# Patient Record
Sex: MALE | Race: BLACK OR AFRICAN AMERICAN | Employment: UNEMPLOYED | URBAN - METROPOLITAN AREA
[De-identification: names, ages, dates, MRNs, and addresses within clinical notes are randomized per-mention and may not be internally consistent; named-entity substitution may affect disease eponyms.]

---

## 2017-12-02 ENCOUNTER — APPOINTMENT (OUTPATIENT)
Dept: GENERAL RADIOLOGY | Age: 37
End: 2017-12-02
Attending: PHYSICIAN ASSISTANT
Payer: MEDICAID

## 2017-12-02 ENCOUNTER — HOSPITAL ENCOUNTER (EMERGENCY)
Age: 37
Discharge: HOME | End: 2017-12-02
Attending: EMERGENCY MEDICINE
Payer: MEDICAID

## 2017-12-02 VITALS
TEMPERATURE: 98.4 F | DIASTOLIC BLOOD PRESSURE: 87 MMHG | BODY MASS INDEX: 23.82 KG/M2 | WEIGHT: 143 LBS | OXYGEN SATURATION: 96 % | HEART RATE: 89 BPM | RESPIRATION RATE: 20 BRPM | HEIGHT: 65 IN | SYSTOLIC BLOOD PRESSURE: 137 MMHG

## 2017-12-02 DIAGNOSIS — Z20.2 POSSIBLE EXPOSURE TO STD: ICD-10-CM

## 2017-12-02 DIAGNOSIS — R05.9 COUGH: Primary | ICD-10-CM

## 2017-12-02 LAB
APPEARANCE UR: CLEAR
BACTERIA URNS QL MICRO: ABNORMAL /HPF
BILIRUB UR QL: NEGATIVE
COLOR UR: YELLOW
EPITH CASTS URNS QL MICRO: ABNORMAL /LPF (ref 0–5)
GLUCOSE UR STRIP.AUTO-MCNC: NEGATIVE MG/DL
HGB UR QL STRIP: ABNORMAL
KETONES UR QL STRIP.AUTO: ABNORMAL MG/DL
LEUKOCYTE ESTERASE UR QL STRIP.AUTO: ABNORMAL
MUCOUS THREADS URNS QL MICRO: ABNORMAL /LPF
NITRITE UR QL STRIP.AUTO: NEGATIVE
PH UR STRIP: 6.5 [PH] (ref 5–8)
PROT UR STRIP-MCNC: NEGATIVE MG/DL
RBC #/AREA URNS HPF: ABNORMAL /HPF (ref 0–5)
SP GR UR REFRACTOMETRY: 1.03 (ref 1–1.03)
UROBILINOGEN UR QL STRIP.AUTO: 1 EU/DL (ref 0.2–1)
WBC URNS QL MICRO: ABNORMAL /HPF (ref 0–4)

## 2017-12-02 PROCEDURE — 87491 CHLMYD TRACH DNA AMP PROBE: CPT

## 2017-12-02 PROCEDURE — 99283 EMERGENCY DEPT VISIT LOW MDM: CPT

## 2017-12-02 PROCEDURE — 81001 URINALYSIS AUTO W/SCOPE: CPT

## 2017-12-02 PROCEDURE — 71020 XR CHEST PA LAT: CPT

## 2017-12-02 NOTE — ED TRIAGE NOTES
Patient states that he started having a cough last Saturday and it has progressively gotten worse.   Patient also states that he had unprotected sex and wants to be checked for an STD

## 2017-12-02 NOTE — ED PROVIDER NOTES
EMERGENCY DEPARTMENT HISTORY AND PHYSICAL EXAM    12:00 PM      Date: 12/2/2017  Patient Name: Jazmyn Michelle    History of Presenting Illness     Chief Complaint   Patient presents with    Cough         History Provided By: Patient    Chief Complaint: cough  Duration:  Days  Timing:  Constant  Location: chest  Quality: Unproductive  Severity: Moderate  Modifying Factors: no relief with OTC cough medication  Associated Symptoms: denies fever and congestion      Additional History (Context): Jazmyn Michelle is a 40 y.o. male with bipolar disorder and paranoid schizophrenia who presents with unproductive cough onset seven days ago. Pt denies fever and congestion and stated he took OTC cough medication that did not provide any relief. Pt admits that he smokes cigars regularly and currently does not have a stable place to live; pt stated he has been staying in a motel for five days but he does not know where he will sleep tonight. After discussion with the provider the pt asked for resources for a homeless shelter in the area. Pt additionally noted that he had unprotective sex twice recently and he would like to be checked for STD's. Pt denies panile discharge, dysuria, hematuria, and swelling in his penis and testicles. Corazon Quick PCP: PROVIDER UNKNOWN        Past History     Past Medical History:  Past Medical History:   Diagnosis Date    Psychiatric disorder     bipolar        Past Surgical History:  History reviewed. No pertinent surgical history. Family History:  History reviewed. No pertinent family history. Social History:  Social History   Substance Use Topics    Smoking status: Current Every Day Smoker    Smokeless tobacco: None    Alcohol use No       Allergies:  No Known Allergies      Review of Systems     Review of Systems   Constitutional: Negative for chills, fatigue and fever. HENT: Negative. Negative for congestion and sore throat. Eyes: Negative. Respiratory: Positive for cough.  Negative for shortness of breath. Cardiovascular: Negative for chest pain and palpitations. Gastrointestinal: Negative for abdominal pain, nausea and vomiting. Genitourinary: Negative for discharge, dysuria, penile pain, penile swelling, scrotal swelling and testicular pain. Musculoskeletal: Negative. Skin: Negative. Neurological: Negative for dizziness, weakness, light-headedness and headaches. Psychiatric/Behavioral: Negative. All other systems reviewed and are negative. Physical Exam     Visit Vitals    /87 (BP 1 Location: Left arm, BP Patient Position: At rest;Sitting)    Pulse 89    Temp 98.4 °F (36.9 °C)    Resp 20    Ht 5' 5\" (1.651 m)    Wt 64.9 kg (143 lb)    SpO2 96%    BMI 23.8 kg/m2     Physical Exam   Constitutional: He is oriented to person, place, and time. He appears well-developed and well-nourished. No distress. HENT:   Head: Normocephalic and atraumatic. Mouth/Throat: Oropharynx is clear and moist.   Eyes: Conjunctivae are normal. No scleral icterus. Neck: Neck supple. No JVD present. No tracheal deviation present. Cardiovascular: Normal rate, regular rhythm and normal heart sounds. Pulmonary/Chest: Effort normal and breath sounds normal. No respiratory distress. He has no wheezes. He has no rales. He exhibits no tenderness. Abdominal: Soft. There is no tenderness. Genitourinary:   Genitourinary Comments: Deferred exam because pt has no sx or complaints   Musculoskeletal: Normal range of motion. Neurological: He is alert and oriented to person, place, and time. He has normal strength. Gait normal. GCS eye subscore is 4. GCS verbal subscore is 5. GCS motor subscore is 6. Skin: Skin is warm and dry. He is not diaphoretic. Psychiatric: He has a normal mood and affect. Nursing note and vitals reviewed. Diagnostic Study Results     Labs -  No results found for this or any previous visit (from the past 12 hour(s)).     Radiologic Studies -   XR CHEST PA LAT    (Results Pending)   negative for acute infiltrate/effusion      Medical Decision Making   I am the first provider for this patient. I reviewed the vital signs, available nursing notes, past medical history, past surgical history, family history and social history. Vital Signs-Reviewed the patient's vital signs. Pulse Oximetry Analysis -  96% on room air    Records Reviewed: Old Medical Records (Time of Review: 12:00 PM)    ED Course: Progress Notes, Reevaluation, and Consults:      Provider Notes (Medical Decision Making):   12:16 PM  41 y/o male c/o cough x 1 week. Also concerned about possible STD exposure due to recent unprotected sex. Denied any recent fevers, chills, productive cough, sore throat, c/p, SOB, abd pain, dysuria, penile discharge, pain or swelling in testicles and has no other complaints. Pt reports he does not have anywhere to stay while in exam room. Will place  consult to provide pt with resources. Also plan for UA/GC and cxr. Pt non-toxic in appearance on exam.  CXR negative for acute infiltrate. All questions answered and patient in agreement with plan of care. Will plan for discharge. Migdalia Vernon PA-C      Procedures:     Core Measures:     Critical Care Time:       Diagnosis     Clinical Impression:   1. Cough    2. Possible exposure to STD        Disposition: Discharged    Follow-up Information     Follow up With Details Comments Contact Info    [facility] EMERGENCY DEPT  If symptoms worsen [address] [place] [address] Call in 1 week As needed for ER follow up [address] [facility]  [phone]           Patient's Medications    No medications on file     _______________________________    Attestations:  [address] [facility] acting as a scribe for and in the presence of Enid Rodriguez Blunt     December 02, 2017 at 12:00 PM       Provider Attestation:      I personally performed the services described in the documentation, reviewed the documentation, as recorded by the scribe in my presence, and it accurately and completely records my words and actions. December 02, 2017 at 12:00 PM - Enid Gutierrez PA-C   _______________________________

## 2017-12-02 NOTE — DISCHARGE INSTRUCTIONS

## 2017-12-04 ENCOUNTER — HOSPITAL ENCOUNTER (EMERGENCY)
Age: 37
Discharge: HOME | End: 2017-12-04
Attending: EMERGENCY MEDICINE | Admitting: EMERGENCY MEDICINE
Payer: MEDICAID

## 2017-12-04 VITALS
WEIGHT: 143 LBS | RESPIRATION RATE: 16 BRPM | DIASTOLIC BLOOD PRESSURE: 91 MMHG | HEIGHT: 65 IN | BODY MASS INDEX: 23.82 KG/M2 | TEMPERATURE: 98.9 F | OXYGEN SATURATION: 98 % | HEART RATE: 117 BPM | SYSTOLIC BLOOD PRESSURE: 150 MMHG

## 2017-12-04 DIAGNOSIS — R03.0 ELEVATED BLOOD PRESSURE READING: ICD-10-CM

## 2017-12-04 DIAGNOSIS — R53.83 MALAISE AND FATIGUE: Primary | ICD-10-CM

## 2017-12-04 DIAGNOSIS — R53.81 MALAISE AND FATIGUE: Primary | ICD-10-CM

## 2017-12-04 DIAGNOSIS — I51.7 LVH (LEFT VENTRICULAR HYPERTROPHY): ICD-10-CM

## 2017-12-04 LAB
ALBUMIN SERPL-MCNC: 3.9 G/DL (ref 3.4–5)
ALBUMIN/GLOB SERPL: 1.1 {RATIO} (ref 0.8–1.7)
ALP SERPL-CCNC: 77 U/L (ref 45–117)
ALT SERPL-CCNC: 37 U/L (ref 16–61)
ANION GAP SERPL CALC-SCNC: 7 MMOL/L (ref 3–18)
AST SERPL-CCNC: 29 U/L (ref 15–37)
BASOPHILS # BLD: 0 K/UL (ref 0–0.06)
BASOPHILS NFR BLD: 0 % (ref 0–3)
BILIRUB SERPL-MCNC: 0.3 MG/DL (ref 0.2–1)
BUN SERPL-MCNC: 11 MG/DL (ref 7–18)
BUN/CREAT SERPL: 14 (ref 12–20)
C TRACH RRNA SPEC QL NAA+PROBE: NEGATIVE
CALCIUM SERPL-MCNC: 8.5 MG/DL (ref 8.5–10.1)
CHLORIDE SERPL-SCNC: 104 MMOL/L (ref 100–108)
CO2 SERPL-SCNC: 27 MMOL/L (ref 21–32)
CREAT SERPL-MCNC: 0.77 MG/DL (ref 0.6–1.3)
D DIMER PPP FEU-MCNC: <0.27 UG/ML(FEU)
DIFFERENTIAL METHOD BLD: ABNORMAL
EOSINOPHIL # BLD: 0.4 K/UL (ref 0–0.4)
EOSINOPHIL NFR BLD: 5 % (ref 0–5)
ERYTHROCYTE [DISTWIDTH] IN BLOOD BY AUTOMATED COUNT: 13.5 % (ref 11.6–14.5)
GLOBULIN SER CALC-MCNC: 3.6 G/DL (ref 2–4)
GLUCOSE SERPL-MCNC: 96 MG/DL (ref 74–99)
HCT VFR BLD AUTO: 42.5 % (ref 36–48)
HGB BLD-MCNC: 14.4 G/DL (ref 13–16)
LYMPHOCYTES # BLD: 3.2 K/UL (ref 0.8–3.5)
LYMPHOCYTES NFR BLD: 39 % (ref 20–51)
MAGNESIUM SERPL-MCNC: 2.4 MG/DL (ref 1.6–2.6)
MCH RBC QN AUTO: 32.7 PG (ref 24–34)
MCHC RBC AUTO-ENTMCNC: 33.9 G/DL (ref 31–37)
MCV RBC AUTO: 96.4 FL (ref 74–97)
MONOCYTES # BLD: 0.2 K/UL (ref 0–1)
MONOCYTES NFR BLD: 3 % (ref 2–9)
N GONORRHOEA RRNA SPEC QL NAA+PROBE: NEGATIVE
NEUTS SEG # BLD: 3.9 K/UL (ref 1.8–8)
NEUTS SEG NFR BLD: 48 % (ref 42–75)
OTHER CELLS NFR BLD MANUAL: 5 %
PLATELET # BLD AUTO: 224 K/UL (ref 135–420)
PLATELET COMMENTS,PCOM: ABNORMAL
PMV BLD AUTO: 9.6 FL (ref 9.2–11.8)
POTASSIUM SERPL-SCNC: 3.9 MMOL/L (ref 3.5–5.5)
PROT SERPL-MCNC: 7.5 G/DL (ref 6.4–8.2)
RBC # BLD AUTO: 4.41 M/UL (ref 4.7–5.5)
RBC MORPH BLD: ABNORMAL
SODIUM SERPL-SCNC: 138 MMOL/L (ref 136–145)
SPECIMEN SOURCE: NORMAL
TROPONIN I SERPL-MCNC: <0.02 NG/ML (ref 0–0.04)
TSH SERPL DL<=0.05 MIU/L-ACNC: 2.63 UIU/ML (ref 0.36–3.74)
WBC # BLD AUTO: 8.1 K/UL (ref 4.6–13.2)

## 2017-12-04 PROCEDURE — 85025 COMPLETE CBC W/AUTO DIFF WBC: CPT | Performed by: EMERGENCY MEDICINE

## 2017-12-04 PROCEDURE — 85379 FIBRIN DEGRADATION QUANT: CPT | Performed by: EMERGENCY MEDICINE

## 2017-12-04 PROCEDURE — 83735 ASSAY OF MAGNESIUM: CPT | Performed by: EMERGENCY MEDICINE

## 2017-12-04 PROCEDURE — 93005 ELECTROCARDIOGRAM TRACING: CPT

## 2017-12-04 PROCEDURE — 84484 ASSAY OF TROPONIN QUANT: CPT | Performed by: EMERGENCY MEDICINE

## 2017-12-04 PROCEDURE — 99283 EMERGENCY DEPT VISIT LOW MDM: CPT

## 2017-12-04 PROCEDURE — 84443 ASSAY THYROID STIM HORMONE: CPT | Performed by: EMERGENCY MEDICINE

## 2017-12-04 PROCEDURE — 80053 COMPREHEN METABOLIC PANEL: CPT | Performed by: EMERGENCY MEDICINE

## 2017-12-04 RX ORDER — HYDROCHLOROTHIAZIDE 25 MG/1
12.5 TABLET ORAL DAILY
Qty: 15 TAB | Refills: 0 | Status: SHIPPED | OUTPATIENT
Start: 2017-12-04 | End: 2018-01-03

## 2017-12-04 NOTE — DISCHARGE INSTRUCTIONS
Fatigue: Care Instructions  Your Care Instructions    Fatigue is a feeling of tiredness, exhaustion, or lack of energy. You may feel fatigue because of too much or not enough activity. It can also come from stress, lack of sleep, boredom, and poor diet. Many medical problems, such as viral infections, can cause fatigue. Emotional problems, especially depression, are often the cause of fatigue. Fatigue is most often a symptom of another problem. Treatment for fatigue depends on the cause. For example, if you have fatigue because you have a certain health problem, treating this problem also treats your fatigue. If depression or anxiety is the cause, treatment may help. Follow-up care is a key part of your treatment and safety. Be sure to make and go to all appointments, and call your doctor if you are having problems. It's also a good idea to know your test results and keep a list of the medicines you take. How can you care for yourself at home? · Get regular exercise. But don't overdo it. Go back and forth between rest and exercise. · Get plenty of rest.  · Eat a healthy diet. Do not skip meals, especially breakfast.  · Reduce your use of caffeine, tobacco, and alcohol. Caffeine is most often found in coffee, tea, cola drinks, and chocolate. · Limit medicines that can cause fatigue. This includes tranquilizers and cold and allergy medicines. When should you call for help? Watch closely for changes in your health, and be sure to contact your doctor if:  ? · You have new symptoms such as fever or a rash. ? · Your fatigue gets worse. ? · You have been feeling down, depressed, or hopeless. Or you may have lost interest in things that you usually enjoy. ? · You are not getting better as expected. Where can you learn more? Go to http://srikanth-martha.info/. Enter O108 in the search box to learn more about \"Fatigue: Care Instructions. \"  Current as of: March 20, 2017  Content Version: 11.4  © 2725-8933 [employer]. Care instructions adapted under license by [employer] (which disclaims liability or warranty for this information). If you have questions about a medical condition or this instruction, always ask your healthcare professional. [address] any warranty or liability for your use of this information. Learning About Left Ventricular Hypertrophy (LVH)  What is left ventricular hypertrophy? Left ventricular hypertrophy (LVH) means that the muscle of the heart's main pump (left ventricle) has become thick and enlarged. This can happen over time if the left ventricle has to work too hard. This part of the heart needs to be strong to pump oxygen-rich blood to your entire body. When the ventricle gets thick, other changes can happen in the heart. The heart's electrical system might not work normally, the heart muscle may not get enough oxygen, and the heart may not pump as well as it should. LVH is usually caused by high blood pressure. It may also be caused by a heart problem, such as hypertrophic cardiomyopathy or a heart valve problem like aortic valve stenosis. It can be stressful to learn that you have a problem with your heart. But there are things you can do to feel better and help keep this condition from getting worse. What are the symptoms? LVH may not cause symptoms. When it does, the most common ones are:  · Shortness of breath. · Feeling tired or dizzy. · Angina symptoms, such as chest pain or pressure, which may be worse when you're active. · Feeling like your heart is fluttering, racing, or pounding (palpitations). New or worse symptoms may be a sign of heart failure. Heart failure means that your heart doesn't pump as much blood as your body needs. What can you expect when you have LVH? LVH is linked to an increased risk of other problems, including heart attack, heart failure, stroke, and heart rhythm problems. Treatment can help reduce these risks. How is LVH treated? The best treatment will depend on what caused LVH. For many people, the focus will be on treating high blood pressure. Getting high blood pressure under control may keep LVH from getting worse. This can help prevent heart failure. It can also help lower the risk of heart attack and stroke. Medicines and lifestyle changes are used to treat high blood pressure. It may take some time to find the right medicine or medicines for you. Work with your doctor by taking your medicines as prescribed and going to all of your follow-up appointments. If LVH was caused by a heart problem, you may have other treatment options. Treatment may help lower your risk of heart failure and other serious problems. What you can do at home  Healthy habits are important for your heart. Taking an active role in your treatment can help you feel better and protect your health. · Be more active. Talk to your doctor before you start an exercise program. Together you can create a plan that can help keep your heart and body healthy. Your doctor might suggest that you get 30 minutes of exercise most days of the week. · Eat heart-healthy foods. Heart-healthy foods include fruits, vegetables, high-fiber foods, fish, and foods low in sodium, saturated fat, and trans fat. · Lose extra weight. Being active and eating healthy foods can help you stay at a healthy weight or lose weight if you need to. · Take your medicines exactly as prescribed. Do not stop or change your medicines without talking to your doctor first. Talk to your doctor if you have problems with your medicines. · Don't smoke. Quitting smoking lowers your risk of heart attack and stroke. If you need help quitting, talk to your doctor about stop-smoking programs and medicines. These can increase your chances of quitting for good. Follow-up care is a key part of your treatment and safety.  Be sure to make and go to all appointments, and call your doctor if you are having problems. It's also a good idea to know your test results and keep a list of the medicines you take. Where can you learn more? Go to http://srikanth-martha.info/. Enter D396 in the search box to learn more about \"Learning About Left Ventricular Hypertrophy (LVH). \"  Current as of: September 21, 2016  Content Version: 11.4  © 1575-7293 [employer]. Care instructions adapted under license by [employer] (which disclaims liability or warranty for this information). If you have questions about a medical condition or this instruction, always ask your healthcare professional. [address] any warranty or liability for your use of this information. Elevated Blood Pressure: Care Instructions  Your Care Instructions    Blood pressure is a measure of how hard the blood pushes against the walls of your arteries. It's normal for blood pressure to go up and down throughout the day. But if it stays up over time, you have high blood pressure. Two numbers tell you your blood pressure. The first number is the systolic pressure. It shows how hard the blood pushes when your heart is pumping. The second number is the diastolic pressure. It shows how hard the blood pushes between heartbeats, when your heart is relaxed and filling with blood. An ideal blood pressure in adults is less than 120/80 (say \"120 over 80\"). High blood pressure is 140/90 or higher. You have high blood pressure if your top number is 140 or higher or your bottom number is 90 or higher, or both. The main test for high blood pressure is simple, fast, and painless. To diagnose high blood pressure, your doctor will test your blood pressure at different times. After testing your blood pressure, your doctor may ask you to test it again when you are home.   If you are diagnosed with high blood pressure, you can work with your doctor to make a long-term plan to manage it. Follow-up care is a key part of your treatment and safety. Be sure to make and go to all appointments, and call your doctor if you are having problems. It's also a good idea to know your test results and keep a list of the medicines you take. How can you care for yourself at home? · Do not smoke. Smoking increases your risk for heart attack and stroke. If you need help quitting, talk to your doctor about stop-smoking programs and medicines. These can increase your chances of quitting for good. · Stay at a healthy weight. · Try to limit how much sodium you eat to less than 2,300 milligrams (mg) a day. Your doctor may ask you to try to eat less than 1,500 mg a day. · Be physically active. Get at least 30 minutes of exercise on most days of the week. Walking is a good choice. You also may want to do other activities, such as running, swimming, cycling, or playing tennis or team sports. · Avoid or limit alcohol. Talk to your doctor about whether you can drink any alcohol. · Eat plenty of fruits, vegetables, and low-fat dairy products. Eat less saturated and total fats. · Learn how to check your blood pressure at home. When should you call for help? Call your doctor now or seek immediate medical care if:  ? · Your blood pressure is much higher than normal (such as 180/110 or higher). ? · You think high blood pressure is causing symptoms such as:  ¨ Severe headache. ¨ Blurry vision. ? Watch closely for changes in your health, and be sure to contact your doctor if:  ? · You do not get better as expected. Where can you learn more? Go to http://srikanth-martha.info/. Enter W070 in the search box to learn more about \"Elevated Blood Pressure: Care Instructions. \"  Current as of: September 21, 2016  Content Version: 11.4  © 6414-1254 [employer], [employer].  Care instructions adapted under license by [employer] (which disclaims liability or warranty for this information). If you have questions about a medical condition or this instruction, always ask your healthcare professional. [place] [address] any warranty or liability for your use of this information.

## 2017-12-04 NOTE — ED PROVIDER NOTES
EMERGENCY DEPARTMENT HISTORY AND PHYSICAL EXAM    2:42 PM      Date: 12/4/2017  Patient Name: Charlie Hollis    History of Presenting Illness     Chief Complaint   Patient presents with    Cough    Chest Congestion         History Provided By: Patient    Chief Complaint: Dizziness and cold   Duration: 1 Weeks  Timing:  Intermittent and Worsening  Location: Head   Quality: N/A  Severity: 0 out of 10  Modifying Factors: Dizziness worsens when he stands still for a couple of minutes   Associated Symptoms: rhinorrhea, headache, cough, chest congestion       Additional History (Context): Charlie Hollis is a 40 y.o. male with hx of bipolar disorder and paranoid schizophrenia presenting to the ED with c/o intermittent, worsening dizziness and cold sx that began about 1 week ago. Pt was seen in the ED 2 days ago for similar sx, however, states he did not have a headache or dizziness at the time. Notes that when he stands still for some period of time he begins to dose off and begins to feel dizzy. States he gets 7-8 hours of sleep every night. Pt denies any drug use. Pt denies any CP, SOB, fever, chills, leg swelling, nausea, vomiting or diarrhea. Associated sx include rhinorrhea, headache, cough and chest congestion. Severity is 0/10. Pt is currently homeless and has not been working for a couple of months. No other sx or complaints given at this time. PCP: PROVIDER UNKNOWN        Past History     Past Medical History:  Past Medical History:   Diagnosis Date    Psychiatric disorder     bipolar        Past Surgical History:  No past surgical history on file. Family History:  No family history on file. Social History:  Social History   Substance Use Topics    Smoking status: Current Every Day Smoker    Smokeless tobacco: Not on file    Alcohol use No       Allergies:  No Known Allergies      Review of Systems       Review of Systems   Constitutional: Negative for activity change, fatigue and fever.    HENT: Positive for congestion and rhinorrhea. Eyes: Negative for visual disturbance. Respiratory: Positive for cough. Negative for shortness of breath. Cardiovascular: Negative for chest pain, palpitations and leg swelling. Gastrointestinal: Negative for abdominal pain, diarrhea, nausea and vomiting. Genitourinary: Negative for dysuria and hematuria. Musculoskeletal: Negative for back pain. Skin: Negative for rash. Neurological: Positive for dizziness and headaches. Negative for weakness and light-headedness. All other systems reviewed and are negative. Physical Exam     Visit Vitals    BP (!) 150/91 (BP 1 Location: Left arm, BP Patient Position: Sitting)    Pulse (!) 117    Temp 98.9 °F (37.2 °C)    Resp 16    Ht 5' 5\" (1.651 m)    Wt 64.9 kg (143 lb)    SpO2 98%    BMI 23.8 kg/m2         Physical Exam   Constitutional: He is oriented to person, place, and time. He appears well-developed and well-nourished. No distress. HENT:   Head: Normocephalic and atraumatic. Right Ear: External ear normal.   Left Ear: External ear normal.   Nose: Nose normal.   Mouth/Throat: Oropharynx is clear and moist.   Eyes: Conjunctivae and EOM are normal. Pupils are equal, round, and reactive to light. No scleral icterus. Neck: Normal range of motion. Neck supple. No JVD present. No tracheal deviation present. No thyromegaly present. Cardiovascular: Regular rhythm, normal heart sounds and intact distal pulses. Exam reveals no gallop and no friction rub. No murmur heard. Tachy    Pulmonary/Chest: Effort normal and breath sounds normal. He exhibits no tenderness. Abdominal: Soft. Bowel sounds are normal. He exhibits no distension. There is no tenderness. There is no rebound and no guarding. Musculoskeletal: Normal range of motion. He exhibits no edema or tenderness. Lymphadenopathy:     He has no cervical adenopathy. Neurological: He is alert and oriented to person, place, and time.  No cranial nerve deficit. Coordination normal.   No sensory loss, Gait normal, Motor 5/5   Skin: Skin is warm and dry. Psychiatric: Judgment and thought content normal.   Nursing note and vitals reviewed. Diagnostic Study Results     Labs -  Recent Results (from the past 12 hour(s))   CBC WITH AUTOMATED DIFF    Collection Time: 12/04/17  4:45 PM   Result Value Ref Range    WBC 8.1 4.6 - 13.2 K/uL    RBC 4.41 (L) 4.70 - 5.50 M/uL    HGB 14.4 13.0 - 16.0 g/dL    HCT 42.5 36.0 - 48.0 %    MCV 96.4 74.0 - 97.0 FL    MCH 32.7 24.0 - 34.0 PG    MCHC 33.9 31.0 - 37.0 g/dL    RDW 13.5 11.6 - 14.5 %    PLATELET 668 017 - 662 K/uL    MPV 9.6 9.2 - 11.8 FL    NEUTROPHILS 48 42 - 75 %    LYMPHOCYTES 39 20 - 51 %    MONOCYTES 3 2 - 9 %    EOSINOPHILS 5 0 - 5 %    BASOPHILS 0 0 - 3 %    OTHER CELL 5 (H) 0      ABS. NEUTROPHILS 3.9 1.8 - 8.0 K/UL    ABS. LYMPHOCYTES 3.2 0.8 - 3.5 K/UL    ABS. MONOCYTES 0.2 0 - 1.0 K/UL    ABS. EOSINOPHILS 0.4 0.0 - 0.4 K/UL    ABS. BASOPHILS 0.0 0.0 - 0.06 K/UL    DF MANUAL      PLATELET COMMENTS ADEQUATE PLATELETS      RBC COMMENTS NORMOCYTIC, NORMOCHROMIC     METABOLIC PANEL, COMPREHENSIVE    Collection Time: 12/04/17  4:45 PM   Result Value Ref Range    Sodium 138 136 - 145 mmol/L    Potassium 3.9 3.5 - 5.5 mmol/L    Chloride 104 100 - 108 mmol/L    CO2 27 21 - 32 mmol/L    Anion gap 7 3.0 - 18 mmol/L    Glucose 96 74 - 99 mg/dL    BUN 11 7.0 - 18 MG/DL    Creatinine 0.77 0.6 - 1.3 MG/DL    BUN/Creatinine ratio 14 12 - 20      GFR est AA >60 >60 ml/min/1.73m2    GFR est non-AA >60 >60 ml/min/1.73m2    Calcium 8.5 8.5 - 10.1 MG/DL    Bilirubin, total 0.3 0.2 - 1.0 MG/DL    ALT (SGPT) 37 16 - 61 U/L    AST (SGOT) 29 15 - 37 U/L    Alk.  phosphatase 77 45 - 117 U/L    Protein, total 7.5 6.4 - 8.2 g/dL    Albumin 3.9 3.4 - 5.0 g/dL    Globulin 3.6 2.0 - 4.0 g/dL    A-G Ratio 1.1 0.8 - 1.7     MAGNESIUM    Collection Time: 12/04/17  4:45 PM   Result Value Ref Range    Magnesium 2.4 1.6 - 2.6 mg/dL   TROPONIN I    Collection Time: 17  4:45 PM   Result Value Ref Range    Troponin-I, Qt. <0.02 0.0 - 0.045 NG/ML   D DIMER    Collection Time: 17  4:45 PM   Result Value Ref Range    D DIMER <0.27 <0.46 ug/ml(FEU)   TSH 3RD GENERATION    Collection Time: 17  4:45 PM   Result Value Ref Range    TSH 2.63 0.36 - 3.74 uIU/mL   EKG, 12 LEAD, INITIAL    Collection Time: 17  5:34 PM   Result Value Ref Range    Ventricular Rate 70 BPM    Atrial Rate 70 BPM    P-R Interval 166 ms    QRS Duration 96 ms    Q-T Interval 380 ms    QTC Calculation (Bezet) 410 ms    Calculated P Axis 42 degrees    Calculated R Axis 65 degrees    Calculated T Axis 22 degrees    Diagnosis       Normal sinus rhythm  Moderate voltage criteria for LVH, may be normal variant  Borderline ECG  When compared with ECG of 2011 08:17,  Nonspecific T wave abnormality now evident in Inferior leads         Radiologic Studies -   No orders to display         Medical Decision Making   I am the first provider for this patient. I reviewed the vital signs, available nursing notes, past medical history, past surgical history, family history and social history. Vital Signs-Reviewed the patient's vital signs. Pulse Oximetry Analysis -  98% on room air (Interpretation) Normal     Cardiac Monitor:  Rate: 117 bpm   Rhythm:  Sinus Tachycardia     EK:34 PM: NSR. Rate of 70 bpm. LVH. No STEMI. Records Reviewed: Nursing Notes (Time of Review: 2:42 PM)    ED Course: Progress Notes, Reevaluation, and Consults:      Provider Notes (Medical Decision Making): Pt is a 46yo male with a hx of behavioral disease, family hx of cardiac disease returns with malaise and cough. Pt as seen 2 days prior and had mild cardiac enlargement with an otherwise normal.  Pt is tachycardic but appears clinically well. At this point will add cardiac marker, d-dimer, TSH then reevaluate.  Nadira Harris DO 4:08 PM    Pt labs are reassuring but workups suggest long standing HTN given the BP reading, LVH, and elevated heart size. Will start HCTZ and have him follow for BP rechecks at this point I feel he can follow as an outpatient and to return if at all worsened or concerned. Somerton Pancho, DO 6:39 PM          Diagnosis     Clinical Impression:   1. Malaise and fatigue    2. Elevated blood pressure reading    3. LVH (left ventricular hypertrophy)        Disposition: Discharge     Follow-up Information     Follow up With Details Comments [address] [facility] [place] Call in 2 days  [address] EMERGENCY DEPT  As needed, If symptoms worsen [address] [address]  [phone]           Patient's Medications   Start Taking    HYDROCHLOROTHIAZIDE (HYDRODIURIL) 25 MG TABLET    Take 0.5 Tabs by mouth daily for 30 days. Continue Taking    No medications on file   These Medications have changed    No medications on file   Stop Taking    No medications on file     _______________________________    Attestations:  Scribe Attestation     Baylee Hamlin acting as a scribe for and in the presence of Nellie Contreras MD      December 04, 2017 at 2:42 PM       Provider Attestation:      I personally performed the services described in the documentation, reviewed the documentation, as recorded by the scribe in my presence, and it accurately and completely records my words and actions.  December 04, 2017 at 2:42 PM - Nellie Contreras MD    _______________________________

## 2017-12-05 LAB
ATRIAL RATE: 70 BPM
CALCULATED P AXIS, ECG09: 42 DEGREES
CALCULATED R AXIS, ECG10: 65 DEGREES
CALCULATED T AXIS, ECG11: 22 DEGREES
DIAGNOSIS, 93000: NORMAL
P-R INTERVAL, ECG05: 166 MS
Q-T INTERVAL, ECG07: 380 MS
QRS DURATION, ECG06: 96 MS
QTC CALCULATION (BEZET), ECG08: 410 MS
VENTRICULAR RATE, ECG03: 70 BPM

## 2017-12-29 ENCOUNTER — HOSPITAL ENCOUNTER (EMERGENCY)
Age: 37
Discharge: HOME | End: 2017-12-29
Attending: EMERGENCY MEDICINE
Payer: MEDICAID

## 2017-12-29 VITALS
HEART RATE: 86 BPM | RESPIRATION RATE: 17 BRPM | DIASTOLIC BLOOD PRESSURE: 37 MMHG | SYSTOLIC BLOOD PRESSURE: 139 MMHG | TEMPERATURE: 98 F | OXYGEN SATURATION: 95 %

## 2017-12-29 DIAGNOSIS — B34.9 VIRAL SYNDROME: Primary | ICD-10-CM

## 2017-12-29 PROCEDURE — 99282 EMERGENCY DEPT VISIT SF MDM: CPT

## 2017-12-29 NOTE — ED NOTES
Pt walked out of room to nurses station screaming at staff with belongings, pt did not receive paper copy of discharge instructions, security notified of pt. Pt escorted to ed entrance.

## 2017-12-29 NOTE — ED NOTES
Pt presented with discharge paperwork, refusing to leave at this time, asking to speak to the doctor again stating he has not been taken care of, demanding breakfast at this time, requesting to be admitted to the \"psych [facility]\", refusing to report SI or HI, states he is dealing with a hard time, verbally abusive toward staff, Dr Lilliana Ridley notified.

## 2017-12-29 NOTE — ED TRIAGE NOTES
Pt arrived c/o sore throat x a few days, states he did not take any medication at home for it, states he has had a non-productive cough, denies any other symptoms at this time, a&ox4

## 2017-12-29 NOTE — DISCHARGE INSTRUCTIONS
Viral Infections: Care Instructions  Your Care Instructions    You don't feel well, but it's not clear what's causing it. You may have a viral infection. Viruses cause many illnesses, such as the common cold, influenza, fever, rashes, and the diarrhea, nausea, and vomiting that are often called \"stomach flu. \" You may wonder if antibiotic medicines could make you feel better. But antibiotics only treat infections caused by bacteria. They don't work on viruses. The good news is that viral infections usually aren't serious. Most will go away in a few days without medical treatment. In the meantime, there are a few things you can do to make yourself more comfortable. Follow-up care is a key part of your treatment and safety. Be sure to make and go to all appointments, and call your doctor if you are having problems. It's also a good idea to know your test results and keep a list of the medicines you take. How can you care for yourself at home? · Get plenty of rest if you feel tired. · Take an over-the-counter pain medicine if needed, such as acetaminophen (Tylenol), ibuprofen (Advil, Motrin), or naproxen (Aleve). Read and follow all instructions on the label. · Be careful when taking over-the-counter cold or flu medicines and Tylenol at the same time. Many of these medicines have acetaminophen, which is Tylenol. Read the labels to make sure that you are not taking more than the recommended dose. Too much acetaminophen (Tylenol) can be harmful. · Drink plenty of fluids, enough so that your urine is light yellow or clear like water. If you have kidney, heart, or liver disease and have to limit fluids, talk with your doctor before you increase the amount of fluids you drink. · Stay home from work, school, and other public places while you have a fever. When should you call for help? Call 911 anytime you think you may need emergency care. For example, call if:  ? · You have severe trouble breathing.    ? · You passed out (lost consciousness). ?Call your doctor now or seek immediate medical care if:  ? · You seem to be getting much sicker. ? · You have a new or higher fever. ? · You have blood in your stools. ? · You have new belly pain, or your pain gets worse. ? · You have a new rash. ? Watch closely for changes in your health, and be sure to contact your doctor if:  ? · You start to get better and then get worse. ? · You do not get better as expected. Where can you learn more? Go to http://srikanth-martha.info/. Enter R940 in the search box to learn more about \"Viral Infections: Care Instructions. \"  Current as of: March 3, 2017  Content Version: 11.4  © 9477-0404 [employer]. Care instructions adapted under license by [employer] (which disclaims liability or warranty for this information). If you have questions about a medical condition or this instruction, always ask your healthcare professional. [address] any warranty or liability for your use of this information.

## 2017-12-29 NOTE — ED PROVIDER NOTES
EMERGENCY DEPARTMENT HISTORY AND PHYSICAL EXAM    8:12 AM      Date: 12/29/2017  Patient Name: Cr Morgan    History of Presenting Illness     Chief Complaint   Patient presents with    Sore Throat         History Provided By: Patient    Chief Complaint: Sore Throat  Duration:  \"a few days. \"  Timing:  Progressive  Location: Throat   Quality: N/A  Severity: Patient denies currently having any pain. Modifying Factors: Denies taking any medication for his syptoms  Associated Symptoms: States he has a non-productive cough, but denies having a subjective fever, nausea, vomiting, and any other symptoms at this time. Additional History (Context): Cr Morgan is a 40 y.o. male with Bipolar Disorder  who presents with a Sore throat for \"a few days. \" Patient denies taking any medication for his symptoms. Denies currently having pain. States that he has a non-productive cough, but denies nausea, vomiting, and any other symptoms at this time. Patient is noted as a current smoker. PCP: PROVIDER UNKNOWN    Current Outpatient Prescriptions   Medication Sig Dispense Refill    hydroCHLOROthiazide (HYDRODIURIL) 25 mg tablet Take 0.5 Tabs by mouth daily for 30 days. 15 Tab 0       Past History     Past Medical History:  Past Medical History:   Diagnosis Date    Psychiatric disorder     bipolar        Past Surgical History:  No past surgical history on file. Family History:  No family history on file. Social History:  Social History   Substance Use Topics    Smoking status: Current Every Day Smoker    Smokeless tobacco: Not on file    Alcohol use No       Allergies:  No Known Allergies      Review of Systems     Review of Systems   Constitutional: Negative for fever. HENT: Positive for sore throat. Respiratory: Positive for cough. Gastrointestinal: Negative for nausea and vomiting. All other systems reviewed and are negative.         Physical Exam     Visit Vitals    BP (!) 139/37 (BP 1 Location: Left arm, BP Patient Position: At rest)    Pulse 86    Temp 98 °F (36.7 °C)    Resp 17    SpO2 95%     Physical Exam   Constitutional: He appears well-developed and well-nourished. Non-toxic appearance. He does not have a sickly appearance. He does not appear ill. No distress. HENT:   Head: Normocephalic and atraumatic. Mouth/Throat: Oropharynx is clear and moist. No oropharyngeal exudate. Eyes: Conjunctivae and EOM are normal. Pupils are equal, round, and reactive to light. No scleral icterus. Neck: Trachea normal and normal range of motion. Neck supple. No hepatojugular reflux and no JVD present. No tracheal deviation present. No thyromegaly present. Cardiovascular: Normal rate, regular rhythm, S1 normal, S2 normal, normal heart sounds, intact distal pulses and normal pulses. Exam reveals no gallop, no S3 and no S4. No murmur heard. Pulses:       Radial pulses are 2+ on the right side, and 2+ on the left side. Dorsalis pedis pulses are 2+ on the right side, and 2+ on the left side. Pulmonary/Chest: Effort normal and breath sounds normal. No accessory muscle usage. No respiratory distress. He has no decreased breath sounds. He has no wheezes. He has no rhonchi. He has no rales. Abdominal: Soft. Normal appearance and bowel sounds are normal. He exhibits no distension and no mass. There is no hepatosplenomegaly. There is no tenderness. There is no rigidity, no rebound, no guarding, no CVA tenderness, no tenderness at McBurney's point and negative Harris's sign. Musculoskeletal: Normal range of motion. Strength 5/5 throughout    Lymphadenopathy:        Head (right side): No submental, no submandibular, no preauricular and no occipital adenopathy present. Head (left side): No submental, no submandibular, no preauricular and no occipital adenopathy present. He has no cervical adenopathy. Right: No supraclavicular adenopathy present.         Left: No supraclavicular adenopathy present. Neurological: He is alert. He has normal strength and normal reflexes. He is not disoriented. No cranial nerve deficit or sensory deficit. Coordination and gait normal. GCS eye subscore is 4. GCS verbal subscore is 5. GCS motor subscore is 6. Grossly intact    Skin: Skin is warm, dry and intact. No rash noted. He is not diaphoretic. Psychiatric: He has a normal mood and affect. His speech is normal and behavior is normal. Judgment and thought content normal. Cognition and memory are normal.   Nursing note and vitals reviewed. Medical Decision Making   I am the first provider for this patient. I reviewed the vital signs, available nursing notes, past medical history, past surgical history, family history and social history. Vital Signs-Reviewed the patient's vital signs. Records Reviewed: Nursing Notes and Triage notes (Time of Review: 8:12 AM)    ED Course: Progress Notes, Reevaluation, and Consults:  Patient was discharged in stable condition. Patient was reassessed and feeling the same. Patient was discharged with no medication. Patient is to return to emergency department if any new or worsening condition. 8:13 AM, 12/29/2017       Provider Notes (Medical Decision Making):  MDM  Number of Diagnoses or Management Options  Viral syndrome:       Diagnosis     Clinical Impression:   1.  Viral syndrome        Disposition: Discharged     Follow-up Information     Follow up With Details Comments Kailash Segura 96 in 2 days  [address] [address] [phone]    [facility] EMERGENCY DEPT Go to If symptoms worsen [address]  [phone]           _______________________________    Attestations:  [facility] acting as a scribe for and in the presence of Lakshmi Whaley, DO      December 29, 2017 at 8:12 AM       Provider Attestation:      I personally performed the services described in the documentation, reviewed the documentation, as recorded by the scribe in my presence, and it accurately and completely records my words and actions.  December 29, 2017 at [address], DO    _______________________________

## 2018-10-08 VITALS
WEIGHT: 128 LBS | TEMPERATURE: 97.2 F | DIASTOLIC BLOOD PRESSURE: 84 MMHG | HEART RATE: 78 BPM | SYSTOLIC BLOOD PRESSURE: 127 MMHG | RESPIRATION RATE: 16 BRPM | OXYGEN SATURATION: 97 % | BODY MASS INDEX: 21.3 KG/M2

## 2018-10-08 PROCEDURE — 99282 EMERGENCY DEPT VISIT SF MDM: CPT

## 2018-10-09 ENCOUNTER — HOSPITAL ENCOUNTER (EMERGENCY)
Age: 38
Discharge: HOME | End: 2018-10-09
Attending: EMERGENCY MEDICINE
Payer: MEDICAID

## 2018-10-09 DIAGNOSIS — L74.519 EXCESSIVE SWEATING, LOCAL: Primary | ICD-10-CM

## 2018-10-09 NOTE — ED TRIAGE NOTES
Pt arrived to ED with complaint of intense sweating in genital area after playing basketball this evening. Pt is a&O x4.

## 2018-10-09 NOTE — ED PROVIDER NOTES
EMERGENCY DEPARTMENT HISTORY AND PHYSICAL EXAM 
 
11:56 PM 
 
 
Date: 10/9/2018 Patient Name: Shaheed Cardozo History of Presenting Illness Chief Complaint Patient presents with  
 Other History Provided By: Patient Chief Complaint: Excessive diaphoresis Duration:  3-4 days Timing:  Constant Location: Genital 
Quality: Not reported Severity: Excessive Modifying Factors: Bacterial wipes, no improvement Associated Symptoms: Genital discomfort Additional History (Context): Shaheed Cardozo is a 40 y.o. male with no pertinent PMHx who presents with constant, excessive diaphoresis of the genital area persisting for 3-4 days. Patient states he has never experienced this before. Reports he has been moving a lot as he takes the bus and plays basketball. Associated symptoms include discomfort; he denies any other symptoms including penile discharge. Also denies any recent sexual activity, EtOH, or reacreational drug use. Patient tried bacterial wipes with no improvement. No other symptoms or concerns were expressed. PCP: PROVIDER UNKNOWN 
 
 
Past History Past Medical History: 
Past Medical History:  
Diagnosis Date  Psychiatric disorder   
 bipolar Past Surgical History: No past surgical history on file. Family History: No family history on file. Social History: 
Social History Substance Use Topics  Smoking status: Current Every Day Smoker  Smokeless tobacco: Not on file  Alcohol use No  
 
 
Allergies: 
No Known Allergies Review of Systems Review of Systems Constitutional: Positive for diaphoresis (excessive in genital area). Negative for chills and fever. HENT: Negative. Negative for congestion, rhinorrhea and sore throat. Eyes: Negative. Negative for pain, discharge and redness. Respiratory: Negative. Negative for cough, chest tightness, shortness of breath and wheezing. Cardiovascular: Negative. Negative for chest pain. Gastrointestinal: Negative. Negative for abdominal pain, constipation, diarrhea, nausea and vomiting. Genitourinary: Negative. Negative for dysuria, flank pain, frequency, hematuria and urgency. Musculoskeletal: Negative. Negative for back pain and neck pain. Skin: Negative. Negative for rash. Neurological: Negative. Negative for syncope, weakness, numbness and headaches. Psychiatric/Behavioral: Negative. All other systems reviewed and are negative. Physical Exam  
 
Visit Vitals  /84 (BP 1 Location: Right arm, BP Patient Position: At rest)  Pulse 78  Temp 97.2 °F (36.2 °C)  Resp 16  Wt 58.1 kg (128 lb)  SpO2 97%  BMI 21.3 kg/m2 Physical Exam  
Constitutional: He is oriented to person, place, and time. He appears well-developed and well-nourished. Non-toxic appearance. He does not have a sickly appearance. He does not appear ill. No distress. HENT:  
Head: Normocephalic and atraumatic. Mouth/Throat: Oropharynx is clear and moist. No oropharyngeal exudate. Eyes: Conjunctivae and EOM are normal. Pupils are equal, round, and reactive to light. No scleral icterus. Neck: Trachea normal and normal range of motion. Neck supple. No hepatojugular reflux and no JVD present. No tracheal deviation present. No thyromegaly present. Cardiovascular: Normal rate, regular rhythm, S1 normal, S2 normal, normal heart sounds, intact distal pulses and normal pulses. Exam reveals no gallop, no S3 and no S4. No murmur heard. Pulses: 
     Radial pulses are 2+ on the right side, and 2+ on the left side. Dorsalis pedis pulses are 2+ on the right side, and 2+ on the left side. Pulmonary/Chest: Effort normal and breath sounds normal. No accessory muscle usage. No respiratory distress. He has no decreased breath sounds. He has no wheezes. He has no rhonchi. He has no rales. Abdominal: Soft.  Normal appearance and bowel sounds are normal. He exhibits no distension and no mass. There is no tenderness. There is no rebound. Hernia confirmed negative in the right inguinal area and confirmed negative in the left inguinal area. Genitourinary: Testes normal and penis normal. Cremasteric reflex is present. No phimosis, paraphimosis, hypospadias, penile erythema or penile tenderness. No discharge found. Musculoskeletal: Normal range of motion. He exhibits no edema or tenderness. Lymphadenopathy:  
     Head (right side): No submental, no submandibular, no preauricular and no occipital adenopathy present. Head (left side): No submental, no submandibular, no preauricular and no occipital adenopathy present. He has no cervical adenopathy. Right: No inguinal and no supraclavicular adenopathy present. Left: No inguinal and no supraclavicular adenopathy present. Neurological: He is alert and oriented to person, place, and time. He has normal strength and normal reflexes. He is not disoriented. No cranial nerve deficit or sensory deficit. Coordination and gait normal. GCS eye subscore is 4. GCS verbal subscore is 5. GCS motor subscore is 6. Skin: Skin is warm, dry and intact. No rash noted. He is not diaphoretic. Psychiatric: He has a normal mood and affect. His speech is normal and behavior is normal. Judgment and thought content normal. Cognition and memory are normal.  
Nursing note and vitals reviewed. Diagnostic Study Results Labs - No results found for this or any previous visit (from the past 12 hour(s)). Radiologic Studies - No orders to display Medical Decision Making Provider Notes (Medical Decision Making): MDM Number of Diagnoses or Management Options Excessive sweating, local:  
Diagnosis management comments: This is a 39 y/o male with excessive sweating in genital area. No sweat seen at this time. Will discharge. I am the first provider for this patient.  
 
I reviewed the vital signs, available nursing notes, past medical history, past surgical history, family history and social history. Vital Signs-Reviewed the patient's vital signs. Records Reviewed: Nursing Notes (Time of Review: 11:56 PM) 
 
ED Course: Progress Notes, Reevaluation, and Consults: 
 
 
Diagnosis I have reassessed the patient. Patient is feeling fine. Patient was discharged in stable condition. Patient is to return to emergency department if any new or worsening condition. Clinical Impression: 1. Excessive sweating, local   
 
 
Disposition: Discharge Follow-up Information Follow up With Details Comments Contact Info [address] [facility] in 2 days For Emergency Department Follow-Up [address] [place] [address] 
[phone]  
  
  
 
_______________________________ Attestations: 
Scribe Attestation Hermelinda Arellano acting as a scribe for and in the presence of [employer], DO October 08, 2018 at 11:56 PM 
    
Provider Attestation:     
I personally performed the services described in the documentation, reviewed the documentation, as recorded by the scribe in my presence, and it accurately and completely records my words and actions. October 08, 2018 at 11:56 PM - [employer], DO   
_______________________________

## 2020-11-08 ENCOUNTER — APPOINTMENT (OUTPATIENT)
Dept: GENERAL RADIOLOGY | Age: 40
End: 2020-11-08
Attending: EMERGENCY MEDICINE

## 2020-11-08 ENCOUNTER — HOSPITAL ENCOUNTER (EMERGENCY)
Age: 40
Discharge: HOME | End: 2020-11-08
Attending: EMERGENCY MEDICINE

## 2020-11-08 VITALS
WEIGHT: 130 LBS | OXYGEN SATURATION: 100 % | RESPIRATION RATE: 17 BRPM | DIASTOLIC BLOOD PRESSURE: 69 MMHG | HEART RATE: 67 BPM | TEMPERATURE: 98.7 F | HEIGHT: 65 IN | SYSTOLIC BLOOD PRESSURE: 127 MMHG | BODY MASS INDEX: 21.66 KG/M2

## 2020-11-08 DIAGNOSIS — F17.200 TOBACCO USE DISORDER: ICD-10-CM

## 2020-11-08 DIAGNOSIS — F12.90 MARIJUANA USER: ICD-10-CM

## 2020-11-08 DIAGNOSIS — R10.13 ABDOMINAL PAIN, EPIGASTRIC: Primary | ICD-10-CM

## 2020-11-08 LAB
ALBUMIN SERPL-MCNC: 4.3 G/DL (ref 3.5–4.7)
ALBUMIN/GLOB SERPL: 1.3 {RATIO}
ALP SERPL-CCNC: 70 U/L (ref 38–126)
ALT SERPL-CCNC: 18 U/L (ref 3–72)
ANION GAP SERPL CALC-SCNC: 9 MMOL/L
AST SERPL W P-5'-P-CCNC: 23 U/L (ref 17–74)
BASOPHILS # BLD: 0 K/UL
BASOPHILS NFR BLD: 0 %
BILIRUB SERPL-MCNC: 0.6 MG/DL (ref 0.2–1)
BUN SERPL-MCNC: 12 MG/DL (ref 9–21)
BUN/CREAT SERPL: 17
CA-I BLD-MCNC: 9.2 MG/DL (ref 8.5–10.5)
CHLORIDE SERPL-SCNC: 102 MMOL/L (ref 94–111)
CO2 SERPL-SCNC: 29 MMOL/L (ref 21–33)
CREAT SERPL-MCNC: 0.7 MG/DL (ref 0.8–1.5)
EOSINOPHIL # BLD: 0.1 K/UL
EOSINOPHIL NFR BLD: 1 %
ERYTHROCYTE [DISTWIDTH] IN BLOOD BY AUTOMATED COUNT: 13 % (ref 11.6–14.5)
GLOBULIN SER CALC-MCNC: 3.3 G/DL
GLUCOSE SERPL-MCNC: 83 MG/DL (ref 70–110)
HCT VFR BLD AUTO: 43.4 % (ref 36–48)
HGB BLD-MCNC: 14.3 G/DL (ref 13–16)
IMM GRANULOCYTES # BLD AUTO: 0 K/UL
IMM GRANULOCYTES NFR BLD AUTO: 0 %
LIPASE SERPL-CCNC: 17 U/L (ref 10–57)
LYMPHOCYTES # BLD: 2.9 K/UL
LYMPHOCYTES NFR BLD: 48 %
MCH RBC QN AUTO: 32 PG (ref 24–34)
MCHC RBC AUTO-ENTMCNC: 32.9 G/DL (ref 31–37)
MCV RBC AUTO: 97.1 FL (ref 74–97)
MONOCYTES # BLD: 0.5 K/UL
MONOCYTES NFR BLD: 8 %
NEUTS SEG # BLD: 2.6 K/UL
NEUTS SEG NFR BLD: 43 %
PLATELET # BLD AUTO: 218 K/UL (ref 135–420)
PMV BLD AUTO: 10.2 FL (ref 9.2–11.8)
POTASSIUM SERPL-SCNC: 4.2 MMOL/L (ref 3.2–5.1)
PROT SERPL-MCNC: 7.6 G/DL (ref 6.1–8.4)
RBC # BLD AUTO: 4.47 M/UL (ref 4.7–5.5)
RBC MORPH BLD: ABNORMAL
SODIUM SERPL-SCNC: 140 MMOL/L (ref 135–145)
WBC # BLD AUTO: 6.1 K/UL (ref 4.6–13.2)

## 2020-11-08 PROCEDURE — 85025 COMPLETE CBC W/AUTO DIFF WBC: CPT

## 2020-11-08 PROCEDURE — 83690 ASSAY OF LIPASE: CPT

## 2020-11-08 PROCEDURE — 80053 COMPREHEN METABOLIC PANEL: CPT

## 2020-11-08 PROCEDURE — 74018 RADEX ABDOMEN 1 VIEW: CPT

## 2020-11-08 PROCEDURE — 99284 EMERGENCY DEPT VISIT MOD MDM: CPT

## 2020-11-08 RX ORDER — ONDANSETRON 4 MG/1
4 TABLET, FILM COATED ORAL
Qty: 20 TAB | Refills: 0 | Status: SHIPPED | OUTPATIENT
Start: 2020-11-08

## 2020-11-08 RX ORDER — FAMOTIDINE 20 MG/1
20 TABLET, FILM COATED ORAL 2 TIMES DAILY
Qty: 20 TAB | Refills: 0 | Status: SHIPPED | OUTPATIENT
Start: 2020-11-08 | End: 2020-11-18

## 2020-11-08 NOTE — ED PROVIDER NOTES
35 y/o male presents to the ED w/ c/o a burning pain in his stomach. Patient states that for approximately 2-3 days, he has been experiencing the burning pain. States that it began with a sore throat which has since gone away, but the pain has not. States that it \"feels like I got tapeworms in my stomach. \" Denies any nausea, vomiting, diarrhea or constipation. Patient also has a high odor of marijuana. The history is provided by the patient. Abdominal Pain    Pertinent negatives include no fever, no diarrhea, no nausea, no vomiting, no dysuria, no frequency, no headaches, no chest pain and no back pain. Past Medical History:   Diagnosis Date    Psychiatric disorder     bipolar        History reviewed. No pertinent surgical history. History reviewed. No pertinent family history.     Social History     Socioeconomic History    Marital status: SINGLE     Spouse name: Not on file    Number of children: Not on file    Years of education: Not on file    Highest education level: Not on file   Occupational History    Not on file   Social Needs    Financial resource strain: Not on file    Food insecurity     Worry: Not on file     Inability: Not on file    Transportation needs     Medical: Not on file     Non-medical: Not on file   Tobacco Use    Smoking status: Current Every Day Smoker     Packs/day: 1.00    Smokeless tobacco: Never Used   Substance and Sexual Activity    Alcohol use: Yes     Comment: socially    Drug use: Yes     Types: Marijuana    Sexual activity: Not on file   Lifestyle    Physical activity     Days per week: Not on file     Minutes per session: Not on file    Stress: Not on file   Relationships    Social connections     Talks on phone: Not on file     Gets together: Not on file     Attends Yazdanism service: Not on file     Active member of club or organization: Not on file     Attends meetings of clubs or organizations: Not on file     Relationship status: Not on file  Intimate partner violence     Fear of current or ex partner: Not on file     Emotionally abused: Not on file     Physically abused: Not on file     Forced sexual activity: Not on file   Other Topics Concern    Not on file   Social History Narrative    Not on file         ALLERGIES: Patient has no known allergies. Review of Systems   Constitutional: Negative for appetite change, chills, diaphoresis and fever. HENT: Negative for congestion, ear pain, rhinorrhea, sore throat and trouble swallowing. Eyes: Negative for photophobia, pain, redness and visual disturbance. Respiratory: Negative for cough, chest tightness, shortness of breath and wheezing. Cardiovascular: Negative for chest pain, palpitations and leg swelling. Gastrointestinal: Positive for abdominal pain. Negative for blood in stool, diarrhea, nausea and vomiting. Endocrine: Negative for polydipsia, polyphagia and polyuria. Genitourinary: Negative for dysuria, frequency and urgency. Musculoskeletal: Negative for back pain, joint swelling and neck pain. Skin: Negative for color change, pallor, rash and wound. Allergic/Immunologic: Negative for environmental allergies, food allergies and immunocompromised state. Neurological: Negative for seizures, syncope and headaches. Hematological: Negative for adenopathy. Does not bruise/bleed easily. Psychiatric/Behavioral: Negative for behavioral problems and confusion. The patient is not nervous/anxious. All other systems reviewed and are negative. Vitals:    11/08/20 1415   BP: 111/69   Pulse: 63   Resp: 17   Temp: 98.7 °F (37.1 °C)   SpO2: 100%   Weight: 59 kg (130 lb)   Height: 5' 5\" (1.651 m)            Physical Exam  Vitals signs and nursing note reviewed. Constitutional:       General: He is not in acute distress. Appearance: Normal appearance. He is normal weight. He is not ill-appearing or diaphoretic.       Comments: Dog smell of marijuana noted   HENT: Head: Normocephalic and atraumatic. Right Ear: Tympanic membrane, ear canal and external ear normal.      Left Ear: Tympanic membrane, ear canal and external ear normal.      Nose: Nose normal.      Mouth/Throat:      Mouth: Mucous membranes are moist.      Pharynx: Oropharynx is clear. Eyes:      Extraocular Movements: Extraocular movements intact. Conjunctiva/sclera: Conjunctivae normal.      Pupils: Pupils are equal, round, and reactive to light. Neck:      Musculoskeletal: Normal range of motion and neck supple. No muscular tenderness. Cardiovascular:      Rate and Rhythm: Normal rate and regular rhythm. Pulses: Normal pulses. Heart sounds: Normal heart sounds. No murmur. No friction rub. No gallop. Pulmonary:      Effort: Pulmonary effort is normal. No respiratory distress. Breath sounds: Normal breath sounds. No wheezing. Abdominal:      Palpations: Abdomen is soft. Tenderness: There is no abdominal tenderness. Hernia: No hernia is present. Musculoskeletal: Normal range of motion. General: No swelling, tenderness or signs of injury. Skin:     General: Skin is warm. Coloration: Skin is not pale. Findings: No bruising or erythema. Neurological:      Mental Status: He is alert and oriented to person, place, and time. Motor: No weakness. Coordination: Coordination normal.   Psychiatric:         Mood and Affect: Mood normal.         Behavior: Behavior normal.         Thought Content: Thought content normal.         Judgment: Judgment normal.          MDM  Number of Diagnoses or Management Options  Abdominal pain, epigastric:   Marijuana user:   Tobacco use disorder:   Diagnosis management comments: Differential diagnosis includes: Gastritis, peptic ulcer disease, duodenal ulcer, nonspecific abdominal pain. Labs and radiographic studies ordered and results noted below. Patient has no tenderness to palpation of his abdomen. Amount and/or Complexity of Data Reviewed  Clinical lab tests: ordered and reviewed  Tests in the radiology section of CPT®: ordered and reviewed  Tests in the medicine section of CPT®: reviewed  Review and summarize past medical records: yes    Risk of Complications, Morbidity, and/or Mortality  Presenting problems: moderate  Diagnostic procedures: moderate  Management options: moderate    Patient Progress  Patient progress: stable         Procedures      Orders Placed This Encounter    XR ABD (KUB)     Standing Status:   Standing     Number of Occurrences:   1     Order Specific Question:   Transport     Answer:   Stretcher [5]     Order Specific Question:   Reason for Exam     Answer:   abdominal pain    CBC WITH AUTOMATED DIFF     Standing Status:   Standing     Number of Occurrences:   1    COMPREHENSIVE METABOLIC PANEL     Standing Status:   Standing     Number of Occurrences:   1    LIPASE     Standing Status:   Standing     Number of Occurrences:   1    famotidine (Pepcid) 20 mg tablet     Sig: Take 1 Tab by mouth two (2) times a day for 10 days. Dispense:  20 Tab     Refill:  0    ondansetron hcl (Zofran) 4 mg tablet     Sig: Take 1 Tab by mouth every eight (8) hours as needed for Nausea. Dispense:  20 Tab     Refill:  0     Recent Results (from the past 12 hour(s))   CBC WITH AUTOMATED DIFF    Collection Time: 11/08/20  2:40 PM   Result Value Ref Range    WBC 6.1 4.6 - 13.2 K/uL    RBC 4.47 (L) 4.70 - 5.50 M/uL    HGB 14.3 13.0 - 16.0 g/dL    HCT 43.4 36.0 - 48.0 %    MCV 97.1 (H) 74.0 - 97.0 FL    MCH 32.0 24.0 - 34.0 PG    MCHC 32.9 31.0 - 37.0 g/dL    RDW 13.0 11.6 - 14.5 %    PLATELET 169 764 - 929 K/uL    MPV 10.2 9.2 - 11.8 FL    NEUTROPHILS PENDING %    LYMPHOCYTES PENDING %    MONOCYTES PENDING %    EOSINOPHILS PENDING %    BASOPHILS PENDING %    IMMATURE GRANULOCYTES PENDING %    ABS. NEUTROPHILS PENDING K/UL    ABS. LYMPHOCYTES PENDING K/UL    ABS. MONOCYTES PENDING K/UL    ABS. EOSINOPHILS PENDING K/UL    ABS. BASOPHILS PENDING K/UL    ABS. IMM. GRANS. PENDING K/UL    DF PENDING    METABOLIC PANEL, COMPREHENSIVE    Collection Time: 11/08/20  2:40 PM   Result Value Ref Range    Sodium 140 135 - 145 mmol/L    Potassium 4.2 3.2 - 5.1 mmol/L    Chloride 102 94 - 111 mmol/L    CO2 29 21 - 33 mmol/L    Anion gap 9 mmol/L    Glucose 83 70 - 110 mg/dL    BUN 12 9 - 21 mg/dL    Creatinine 0.70 (L) 0.8 - 1.50 mg/dL    BUN/Creatinine ratio 17      GFR est AA >60 ml/min/1.73m2    GFR est non-AA >60 ml/min/1.73m2    Calcium 9.2 8.5 - 10.5 mg/dL    Bilirubin, total 0.6 0.2 - 1.0 mg/dL    AST (SGOT) 23 17 - 74 U/L    ALT (SGPT) 18 3 - 72 U/L    Alk. phosphatase 70 38 - 126 U/L    Protein, total 7.6 6.1 - 8.4 g/dL    Albumin 4.3 3.5 - 4.7 g/dL    Globulin 3.3 g/dL    A-G Ratio 1.3     LIPASE    Collection Time: 11/08/20  2:40 PM   Result Value Ref Range    Lipase 17 10 - 57 U/L     3:56 PM Upon re-evaluation the patient's symptoms have improved. Pt has non-toxic appearance and condition is stable for discharge. He was informed of his results, instructed to f/u with the health department or the referred physician below and return to the ED upon worsening of symptoms. All questions and concerns were addressed. Diagnosis:   1. Abdominal pain, epigastric    2. Tobacco use disorder    3.  Marijuana user          Disposition: Discharge home    Follow-up Information     Follow up With Specialties Details Why [address] [facility] [address] [address] Department  Schedule an appointment as soon as possible for a visit in 1 day  [address] [facility] [address] [facility]    Jeana Lucio MD Internal Medicine Schedule an appointment as soon as possible for a visit in 2 days  [address] [facility] [address]  [phone]      [facility] EMERGENCY DEPT Emergency Medicine  As needed, If symptoms worsen [facility] [address] [id number] [phone]          Patient's Medications Start Taking    FAMOTIDINE (PEPCID) 20 MG TABLET    Take 1 Tab by mouth two (2) times a day for 10 days. ONDANSETRON HCL (ZOFRAN) 4 MG TABLET    Take 1 Tab by mouth every eight (8) hours as needed for Nausea. Continue Taking    No medications on file   These Medications have changed    No medications on file   Stop Taking    No medications on file       By signing my name below, I, Prema Glynn, attest that this documentation has been prepared under the direction and in presence of Dr Gricel Appiah on 11/08/2020. Electronically signed: Isamar Gifford, 11/08/2020 1442         Provider Attestation:  I personally performed the services described in the documentation, reviewed the documentation, as recorded by the scribe in my presence, and it accurately and completely records my words and actions. Dr. Colleen Aguilar.  Emily Marrero D.O. 3:57 PM

## 2020-11-08 NOTE — ED TRIAGE NOTES
Pt reports abdominal pain for about 3 days now, denies any n/v/d, denies any difficulty urinating or trouble moving his bowels.

## 2020-11-08 NOTE — DISCHARGE INSTRUCTIONS
Patient Education        Abdominal Pain: Care Instructions  Your Care Instructions     Abdominal pain has many possible causes. Some aren't serious and get better on their own in a few days. Others need more testing and treatment. If your pain continues or gets worse, you need to be rechecked and may need more tests to find out what is wrong. You may need surgery to correct the problem. Don't ignore new symptoms, such as fever, nausea and vomiting, urination problems, pain that gets worse, and dizziness. These may be signs of a more serious problem. Your doctor may have recommended a follow-up visit in the next 8 to 12 hours. If you are not getting better, you may need more tests or treatment. The doctor has checked you carefully, but problems can develop later. If you notice any problems or new symptoms, get medical treatment right away. Follow-up care is a key part of your treatment and safety. Be sure to make and go to all appointments, and call your doctor if you are having problems. It's also a good idea to know your test results and keep a list of the medicines you take. How can you care for yourself at home? · Rest until you feel better. · To prevent dehydration, drink plenty of fluids, enough so that your urine is light yellow or clear like water. Choose water and other caffeine-free clear liquids until you feel better. If you have kidney, heart, or liver disease and have to limit fluids, talk with your doctor before you increase the amount of fluids you drink. · If your stomach is upset, eat mild foods, such as rice, dry toast or crackers, bananas, and applesauce. Try eating several small meals instead of two or three large ones. · Wait until 48 hours after all symptoms have gone away before you have spicy foods, alcohol, and drinks that contain caffeine. · Do not eat foods that are high in fat. · Avoid anti-inflammatory medicines such as aspirin, ibuprofen (Advil, Motrin), and naproxen (Aleve). These can cause stomach upset. Talk to your doctor if you take daily aspirin for another health problem. When should you call for help? Call 911 anytime you think you may need emergency care. For example, call if:    · You passed out (lost consciousness).     · You pass maroon or very bloody stools.     · You vomit blood or what looks like coffee grounds.     · You have new, severe belly pain. Call your doctor now or seek immediate medical care if:    · Your pain gets worse, especially if it becomes focused in one area of your belly.     · You have a new or higher fever.     · Your stools are black and look like tar, or they have streaks of blood.     · You have unexpected vaginal bleeding.     · You have symptoms of a urinary tract infection. These may include:  ? Pain when you urinate. ? Urinating more often than usual.  ? Blood in your urine.     · You are dizzy or lightheaded, or you feel like you may faint. Watch closely for changes in your health, and be sure to contact your doctor if:    · You are not getting better after 1 day (24 hours). Where can you learn more? Go to http://www.gray.com/  Enter X918 in the search box to learn more about \"Abdominal Pain: Care Instructions. \"  Current as of: June 26, 2019               Content Version: 12.6  © 6675-1875 [employer] Care instructions adapted under license by [employer] (which disclaims liability or warranty for this information). If you have questions about a medical condition or this instruction, always ask your healthcare professional. [place] [address] any warranty or liability for your use of this information. Patient Education        Learning About Benefits From Quitting Smoking  How does quitting smoking make you healthier? If you're thinking about quitting smoking, you may have a few reasons to be smoke-free. Your health may be one of them.   · When you quit smoking, you lower your risks for cancer, lung disease, heart attack, stroke, blood vessel disease, and blindness from macular degeneration. · When you're smoke-free, you get sick less often, and you heal faster. You are less likely to get colds, flu, bronchitis, and pneumonia. · As a nonsmoker, you may find that your mood is better and you are less stressed. When and how will you feel healthier? Quitting has real health benefits that start from day 1 of being smoke-free. And the longer you stay smoke-free, the healthier you get and the better you feel. The first hours  · After just 20 minutes, your blood pressure and heart rate go down. That means there's less stress on your heart and blood vessels. · Within 12 hours, the level of carbon monoxide in your blood drops back to normal. That makes room for more oxygen. With more oxygen in your body, you may notice that you have more energy than when you smoked. After 2 weeks  · Your lungs start to work better. · Your risk of heart attack starts to drop. After 1 month  · When your lungs are clear, you cough less and breathe deeper, so it's easier to be active. · Your sense of taste and smell return. That means you can enjoy food more than you have since you started smoking. Over the years  · Over the years, your risks of heart disease, heart attack, and stroke are lower. · After 10 years, your risk of dying from lung cancer is cut by about half. And your risk for many other types of cancer is lower too. How would quitting help others in your life? When you quit smoking, you improve the health of everyone who now breathes in your smoke. · Their heart, lung, and cancer risks drop, much like yours. · They are sick less. For babies and small children, living smoke-free means they're less likely to have ear infections, pneumonia, and bronchitis. · If you're a woman who is or will be pregnant someday, quitting smoking means a healthier .   · Children who are close to you are less likely to become adult smokers. Where can you learn more? Go to http://www.gray.com/  Enter O319 in the search box to learn more about \"Learning About Benefits From Quitting Smoking. \"  Current as of: March 12, 2020               Content Version: 12.6  © 1318-3695 [employer]. Care instructions adapted under license by [employer] (which disclaims liability or warranty for this information). If you have questions about a medical condition or this instruction, always ask your healthcare professional. [address] any warranty or liability for your use of this information.

## 2020-12-02 ENCOUNTER — HOSPITAL ENCOUNTER (EMERGENCY)
Age: 40
Discharge: LEFT BEFORE DISCHARGE | End: 2020-12-02
Attending: EMERGENCY MEDICINE

## 2020-12-02 VITALS
WEIGHT: 125 LBS | BODY MASS INDEX: 24.54 KG/M2 | HEIGHT: 60 IN | RESPIRATION RATE: 16 BRPM | DIASTOLIC BLOOD PRESSURE: 110 MMHG | SYSTOLIC BLOOD PRESSURE: 152 MMHG | HEART RATE: 86 BPM | OXYGEN SATURATION: 100 % | TEMPERATURE: 98.2 F

## 2020-12-02 PROCEDURE — 75810000275 HC EMERGENCY DEPT VISIT NO LEVEL OF CARE

## 2020-12-02 NOTE — ED TRIAGE NOTES
Pt states he wants his results from his last lab work 2 weeks ago, he states he does not want blood work done today, he just wants his results from last time.

## 2020-12-02 NOTE — ED TRIAGE NOTES
Unable to finish triaget, pt is uncooperative, interrupting nurse and doctor as they are trying to ask question/assess/triage  Pt starting to get agitated, anxious. MD able to review last lab results with pt, however he did not seem to understand. Pt advised to go online and set up a My Chart acct.  Pt states he want to be seen, but he is unable to state what he would like to be seen for, he keeps asking how much he owes this hospital and talking about his insurance

## 2020-12-02 NOTE — ED NOTES
ED Course as of Dec 02 1408   Wed Dec 02, 2020   1407 Patient states he is here just for a review of his laboratory testing hold laboratory testing pulled up, he then started requesting lab tests that are from outside facility. He did not have any acute medical concerns, rather wanted somebody to explain to him his labs from prior ER visit. Labs were revealed to him, discussed with him how to get a [employer] account so we can review his labs further.   Patient became very upset, I have given him a minute to calm down and in that period of time he is decided to leave    [BT]      ED Course User Index  [BT] Ceci Tinajero MD

## 2022-03-27 ENCOUNTER — HOSPITAL ENCOUNTER (EMERGENCY)
Age: 42
Discharge: HOME | End: 2022-03-27

## 2022-03-27 ENCOUNTER — APPOINTMENT (OUTPATIENT)
Dept: GENERAL RADIOLOGY | Age: 42
End: 2022-03-27
Attending: PHYSICIAN ASSISTANT

## 2022-03-27 VITALS
RESPIRATION RATE: 18 BRPM | SYSTOLIC BLOOD PRESSURE: 164 MMHG | HEART RATE: 76 BPM | DIASTOLIC BLOOD PRESSURE: 88 MMHG | OXYGEN SATURATION: 97 % | TEMPERATURE: 98.3 F | WEIGHT: 138.12 LBS

## 2022-03-27 VITALS
RESPIRATION RATE: 16 BRPM | TEMPERATURE: 98.2 F | BODY MASS INDEX: 24.9 KG/M2 | HEIGHT: 65 IN | OXYGEN SATURATION: 99 % | WEIGHT: 149.47 LBS | SYSTOLIC BLOOD PRESSURE: 149 MMHG | HEART RATE: 76 BPM | DIASTOLIC BLOOD PRESSURE: 99 MMHG

## 2022-03-27 DIAGNOSIS — M54.6 ACUTE THORACIC BACK PAIN, UNSPECIFIED BACK PAIN LATERALITY: Primary | ICD-10-CM

## 2022-03-27 DIAGNOSIS — M54.9 ACUTE UPPER BACK PAIN: Primary | ICD-10-CM

## 2022-03-27 DIAGNOSIS — Z59.00 HOMELESSNESS: ICD-10-CM

## 2022-03-27 PROCEDURE — 99284 EMERGENCY DEPT VISIT MOD MDM: CPT | Performed by: PHYSICIAN ASSISTANT

## 2022-03-27 PROCEDURE — 72070 X-RAY EXAM THORAC SPINE 2VWS: CPT | Performed by: RADIOLOGY

## 2022-03-27 PROCEDURE — 72070 X-RAY EXAM THORAC SPINE 2VWS: CPT

## 2022-03-27 PROCEDURE — 99283 EMERGENCY DEPT VISIT LOW MDM: CPT

## 2022-03-27 PROCEDURE — 10002803 HB RX 637: Performed by: PHYSICIAN ASSISTANT

## 2022-03-27 RX ORDER — IBUPROFEN 600 MG/1
600 TABLET ORAL ONCE
Status: DISCONTINUED | OUTPATIENT
Start: 2022-03-27 | End: 2022-03-27 | Stop reason: HOSPADM

## 2022-03-27 RX ORDER — IBUPROFEN 800 MG/1
800 TABLET ORAL 3 TIMES DAILY PRN
Qty: 30 TABLET | Refills: 0 | Status: SHIPPED | OUTPATIENT
Start: 2022-03-27

## 2022-03-27 RX ORDER — IBUPROFEN 400 MG/1
800 TABLET ORAL ONCE
Status: COMPLETED | OUTPATIENT
Start: 2022-03-27 | End: 2022-03-27

## 2022-03-27 RX ADMIN — IBUPROFEN 800 MG: 400 TABLET, FILM COATED ORAL at 12:11

## 2022-03-27 SDOH — ECONOMIC STABILITY - HOUSING INSECURITY: HOMELESSNESS UNSPECIFIED: Z59.00

## 2022-03-27 ASSESSMENT — ENCOUNTER SYMPTOMS
COLOR CHANGE: 0
NUMBNESS: 0
WEAKNESS: 0
CHILLS: 0
FEVER: 0
WOUND: 0
SHORTNESS OF BREATH: 0
BACK PAIN: 1
ABDOMINAL PAIN: 0

## 2022-03-27 ASSESSMENT — PAIN SCALES - GENERAL
PAINLEVEL_OUTOF10: 10
PAINLEVEL_OUTOF10: 10

## 2022-03-27 ASSESSMENT — PAIN DESCRIPTION - PAIN TYPE
TYPE: ACUTE PAIN
TYPE: ACUTE PAIN

## 2022-04-03 ENCOUNTER — HOSPITAL ENCOUNTER (EMERGENCY)
Age: 42
Discharge: HOME | End: 2022-04-03
Attending: EMERGENCY MEDICINE

## 2022-04-03 VITALS
RESPIRATION RATE: 20 BRPM | SYSTOLIC BLOOD PRESSURE: 137 MMHG | HEART RATE: 87 BPM | TEMPERATURE: 98.7 F | DIASTOLIC BLOOD PRESSURE: 90 MMHG | OXYGEN SATURATION: 100 %

## 2022-04-03 DIAGNOSIS — M54.6 CHRONIC THORACIC BACK PAIN, UNSPECIFIED BACK PAIN LATERALITY: Primary | ICD-10-CM

## 2022-04-03 DIAGNOSIS — Z13.9 ENCOUNTER FOR MEDICAL SCREENING EXAMINATION: ICD-10-CM

## 2022-04-03 DIAGNOSIS — Z59.00 HOMELESSNESS: ICD-10-CM

## 2022-04-03 DIAGNOSIS — G89.29 CHRONIC THORACIC BACK PAIN, UNSPECIFIED BACK PAIN LATERALITY: Primary | ICD-10-CM

## 2022-04-03 PROCEDURE — 99283 EMERGENCY DEPT VISIT LOW MDM: CPT

## 2022-04-03 PROCEDURE — 99282 EMERGENCY DEPT VISIT SF MDM: CPT | Performed by: PHYSICIAN ASSISTANT

## 2022-04-03 SDOH — ECONOMIC STABILITY - HOUSING INSECURITY: HOMELESSNESS UNSPECIFIED: Z59.00

## 2022-04-12 ENCOUNTER — HOSPITAL ENCOUNTER (EMERGENCY)
Facility: CLINIC | Age: 42
Discharge: HOME | End: 2022-04-12
Attending: EMERGENCY MEDICINE | Admitting: EMERGENCY MEDICINE

## 2022-04-12 VITALS
DIASTOLIC BLOOD PRESSURE: 115 MMHG | HEART RATE: 71 BPM | OXYGEN SATURATION: 97 % | TEMPERATURE: 97.7 F | SYSTOLIC BLOOD PRESSURE: 158 MMHG

## 2022-04-12 DIAGNOSIS — M79.675 PAIN OF TOE OF LEFT FOOT: ICD-10-CM

## 2022-04-12 PROCEDURE — 99283 EMERGENCY DEPT VISIT LOW MDM: CPT

## 2022-04-12 RX ORDER — TERBINAFINE HYDROCHLORIDE 250 MG/1
250 TABLET ORAL DAILY
Qty: 90 TABLET | Refills: 0 | Status: SHIPPED | OUTPATIENT
Start: 2022-04-12 | End: 2022-07-11

## 2022-04-12 NOTE — ED PROVIDER NOTES
History     Chief Complaint:  toe pain    HPI   Cecile Weston is a 41 year old male who presents with left right large toe pain.  He states this is been hurting him for several months now.  He went to an ER a few days ago and had an x-ray which was unremarkable.  He noticed the pain increased today especially when he was trying to walk from the [facility] to the [facility] where he is currently staying as he is homeless.  He denies erythema, warmth, fevers.    ROS:  Review of Systems  Positive-toe pain  Negative-fevers, warmth, tenderness to palpation    Allergies:  Allergies have not been reviewed     Medications:    No meds    Past Medical History:    No past medical history on file.  There is no problem list on file for this patient.       Past Surgical History:    No past surgical history on file.     Family History:    family history is not on file.    Social History:     PCP: No primary care provider on file.     Physical Exam   No data found.     Physical Exam  General/Appearance: appears stated age  Eyes: EOMI, no scleral injection, no icterus  ENT: MMM  MSK: ANAYA, good tone, no bony abnormality  Skin: warm and well-perfused, no rash, no edema, no ecchymosis, nl turgor, right large toenail thickened with mild discomfort to palpation but no surrounding erythema, exudate  Neuro: GCS 15, alert and oriented, no gross focal neuro deficits -normal sensation to toe      Emergency Department Course       Emergency Department Course:  Reviewed:  I reviewed nursing notes, vitals and past medical history    Assessments:  0350 I obtained history and examined the patient as noted above.     Disposition:  The patient was discharged to home.     Impression & Plan      Medical Decision Making:  This patient is a homeless 41-year-old male, pleasant, who presents with right toe pain this been going on for several months.  There is no erythema, warmth, significant tenderness to suggest infection.  This pain really is  over the distal phalanges and so I doubt gout.  He does have a thickened toenail consistent with onychomycosis which may be the contributing cause.  I think is reasonable to treat this with oral medications.  Otherwise he had an x-ray done just a few days ago when he presented to a different ER with similar pain and this x-ray was unremarkable.  I do not think we need to repeat this today.  I will send him home with a prescription and follow-up with his PCP as needed.  Diagnosis:    ICD-10-CM    1. Pain of toe of left foot  M79.675         Discharge Medications:  New Prescriptions    TERBINAFINE (LAMISIL) 250 MG TABLET    Take 1 tablet (250 mg) by mouth in the morning.        4/12/2022   Susan Sanchez*        Susan Sanchez MD  04/12/22 0358

## 2022-04-12 NOTE — ED NOTES
Bed: ED24  Expected date: 4/12/22  Expected time: 3:45 AM  Means of arrival: Ambulance  Comments:  Stew [address] 41M foot pain

## 2022-04-12 NOTE — ED TRIAGE NOTES
Pt arrived via EMS. Pt walked from [facility] to [facility] and pt reports he had a limp due to intermittent toe pain.

## 2022-11-25 ENCOUNTER — HOSPITAL ENCOUNTER (EMERGENCY)
Age: 42
Discharge: LEFT BEFORE DISCHARGE | End: 2022-11-26
Attending: FAMILY MEDICINE
Payer: COMMERCIAL

## 2022-11-25 VITALS
HEART RATE: 69 BPM | SYSTOLIC BLOOD PRESSURE: 139 MMHG | OXYGEN SATURATION: 100 % | DIASTOLIC BLOOD PRESSURE: 92 MMHG | HEIGHT: 65 IN | WEIGHT: 108 LBS | RESPIRATION RATE: 18 BRPM | TEMPERATURE: 97.5 F | BODY MASS INDEX: 17.99 KG/M2

## 2022-11-25 DIAGNOSIS — R21 RASH: Primary | ICD-10-CM

## 2022-11-25 PROCEDURE — 99281 EMR DPT VST MAYX REQ PHY/QHP: CPT

## 2022-11-25 RX ORDER — PREDNISONE 20 MG/1
60 TABLET ORAL
Status: DISCONTINUED | OUTPATIENT
Start: 2022-11-25 | End: 2022-11-26 | Stop reason: HOSPADM

## 2022-11-25 RX ORDER — CETIRIZINE HYDROCHLORIDE 10 MG/1
10 TABLET ORAL
Status: DISCONTINUED | OUTPATIENT
Start: 2022-11-25 | End: 2022-11-26 | Stop reason: HOSPADM

## 2022-11-26 NOTE — ED NOTES
Pt. Standing in [address] at this time. This nurse attempted to redirect pt. To his room and told pt. I would be in with ordered meds. Pt. Demanding to speak with 2nd person who came in his room. Pt. Stated, \"I'm not staying for no medicine, I came by myself and not with them. \" Pt continued to yell profanities as he walked out the doors. Pt. Left prior to meds being given. MD aware.

## 2022-11-26 NOTE — ED PROVIDER NOTES
Patient presents to the ED for a rash on his chest. Symptoms began a couple weeks ago after coming no contact with a \"dirty blanket\". Patient reports raised rash with redness and itching. Nothing makes the symptoms worse, per patient driving farther from [address] and closer to [place] has made the rash better. He reports also having improvement with calamine lotion and topical hydrocortisone. He denies fever, chills, SOB, wheezing, throat/tongue swelling, chest pain, dizziness or syncope. He denies similar symptoms in the past.        Past Medical History:   Diagnosis Date    Psychiatric disorder     bipolar        History reviewed. No pertinent surgical history. History reviewed. No pertinent family history. Social History     Socioeconomic History    Marital status: SINGLE     Spouse name: Not on file    Number of children: Not on file    Years of education: Not on file    Highest education level: Not on file   Occupational History    Not on file   Tobacco Use    Smoking status: Every Day     Packs/day: 3.00     Types: Cigarettes    Smokeless tobacco: Never   Substance and Sexual Activity    Alcohol use: Not Currently    Drug use: Not Currently    Sexual activity: Not on file   Other Topics Concern    Not on file   Social History Narrative    Not on file     Social Determinants of Health     Financial Resource Strain: Not on file   Food Insecurity: Not on file   Transportation Needs: Not on file   Physical Activity: Not on file   Stress: Not on file   Social Connections: Not on file   Intimate Partner Violence: Not on file   Housing Stability: Not on file         ALLERGIES: Patient has no known allergies. Review of Systems   Constitutional: Negative. HENT: Negative. Respiratory: Negative. Cardiovascular: Negative. Gastrointestinal: Negative. Genitourinary: Negative. Skin:  Positive for rash. Neurological: Negative. All other systems reviewed and are negative.     Vitals: 11/25/22 2308   BP: (!) 139/92   Pulse: 69   Resp: 18   Temp: 97.5 °F (36.4 °C)   SpO2: 100%   Weight: 49 kg (108 lb)   Height: 5' 5\" (1.651 m)            Physical Exam  Vitals and nursing note reviewed. Constitutional:       General: He is not in acute distress. Appearance: Normal appearance. He is normal weight. He is not ill-appearing, toxic-appearing or diaphoretic. HENT:      Head: Normocephalic and atraumatic. Right Ear: External ear normal.      Left Ear: External ear normal.      Nose: Nose normal.      Mouth/Throat:      Mouth: Mucous membranes are moist.   Eyes:      General: No scleral icterus. Right eye: No discharge. Left eye: No discharge. Extraocular Movements: Extraocular movements intact. Cardiovascular:      Rate and Rhythm: Normal rate and regular rhythm. Pulses: Normal pulses. Pulmonary:      Effort: Pulmonary effort is normal. No respiratory distress. Breath sounds: Normal breath sounds. No stridor. No wheezing, rhonchi or rales. Musculoskeletal:      Cervical back: Neck supple. No tenderness. Lymphadenopathy:      Cervical: No cervical adenopathy. Skin:     Findings: Erythema and rash present. Rash is not scaling or vesicular. Neurological:      Mental Status: He is alert. Sensory: No sensory deficit.       Gait: Gait normal.   Psychiatric:         Speech: Speech is tangential.        MDM  Number of Diagnoses or Management Options  Rash  Diagnosis management comments: Atopic dermatitis, urticaria, idiopathic hives, contact dermatitis, pityriasis rosacea     Risk of Complications, Morbidity, and/or Mortality  Presenting problems: low  Diagnostic procedures: low  Management options: low  General comments: Patient left ED prior to treatment for unknown reason    Patient Progress  Patient progress: other (comment) (unknown)         Procedures

## 2022-11-26 NOTE — ED TRIAGE NOTES
Pt. States he started with a rash to his chest from using a dirty blanket two weeks ago. Pt. Has been using calamine and hydrocortisone at home and area is getting better but is still very itchy.

## 2023-01-28 ENCOUNTER — HOSPITAL ENCOUNTER (EMERGENCY)
Age: 43
Discharge: HOME | End: 2023-01-28
Attending: INTERNAL MEDICINE
Payer: COMMERCIAL

## 2023-01-28 VITALS
RESPIRATION RATE: 18 BRPM | SYSTOLIC BLOOD PRESSURE: 142 MMHG | BODY MASS INDEX: 20.16 KG/M2 | HEART RATE: 98 BPM | WEIGHT: 121 LBS | DIASTOLIC BLOOD PRESSURE: 104 MMHG | OXYGEN SATURATION: 100 % | HEIGHT: 65 IN | TEMPERATURE: 98.2 F

## 2023-01-28 DIAGNOSIS — Z53.21 PATIENT LEFT WITHOUT BEING SEEN: Primary | ICD-10-CM

## 2023-01-28 PROCEDURE — 75810000275 HC EMERGENCY DEPT VISIT NO LEVEL OF CARE

## 2023-01-28 RX ORDER — AMLODIPINE BESYLATE 10 MG/1
10 TABLET ORAL DAILY
COMMUNITY
Start: 2022-12-11

## 2023-01-28 NOTE — ED PROVIDER NOTES
[facility] EMERGENCY DEPT  EMERGENCY DEPARTMENT HISTORY AND PHYSICAL EXAM      Date: 1/28/2023  Patient Name: Sloane Kelly  MRN: [id number]  YOB: 1980  Date of evaluation: 1/28/2023  Provider: Darrel Samaniego MD   Note Started: 12:02 PM 1/28/23    HISTORY OF PRESENT ILLNESS     Chief Complaint   Patient presents with    Other       History Provided By: Nurse    HPI: Sloane Kelly, 43 y.o. male  WENT INTO THE ROOM TO SEE PT AND HE HAD LEFT    PAST MEDICAL HISTORY   Past Medical History:  Past Medical History:   Diagnosis Date    Psychiatric disorder     bipolar        Past Surgical History:  History reviewed. No pertinent surgical history. Family History:  History reviewed. No pertinent family history. Social History:  Social History     Tobacco Use    Smoking status: Every Day     Packs/day: 3.00     Types: Cigarettes    Smokeless tobacco: Never   Substance Use Topics    Alcohol use: Not Currently    Drug use: Not Currently       Allergies:  No Known Allergies    PCP: None    Current Meds:   Previous Medications    AMLODIPINE (NORVASC) 10 MG TABLET    Take 10 mg by mouth daily. REVIEW OF SYSTEMS   Review of Systems  LEFT AFTER TRIAGE    PHYSICAL EXAM     ED Triage Vitals [01/28/23 1142]   ED Encounter Vitals Group      BP (!) 142/104      Pulse (Heart Rate) 98      Resp Rate 18      Temp 98.2 °F (36.8 °C)      Temp src       O2 Sat (%) 98 %      Weight 121 lb      Height 5' 5\"      Physical Exam    SCREENINGS               No data recorded        LAB, EKG AND DIAGNOSTIC RESULTS   Labs:  No results found for this or any previous visit (from the past 12 hour(s)). EKG: Initial EKG interpreted by me.  Shows     Radiologic Studies:  Non-plain film images such as CT, Ultrasound and MRI are read by the radiologist. Plain radiographic images are visualized and preliminarily interpreted by the ED Provider with the below findings:    PATIENT LEFT AFTER TRIAGE    Interpretation per the Radiologist below, if available at the time of this note:  No results found. PROCEDURES   Unless otherwise noted below, none. Performed by: Felix Galvan MD   Procedures      CRITICAL CARE TIME       ED COURSE and DIFFERENTIAL DIAGNOSIS/MDM   Vitals:    Vitals:    01/28/23 1142 01/28/23 1146   BP: (!) 142/104    Pulse: 98    Resp: 18    Temp: 98.2 °F (36.8 °C)    SpO2: 98% 100%   Weight: 54.9 kg (121 lb)    Height: 5' 5\" (1.651 m)         Patient was given the following medications:  Medications - No data to display    CONSULTS: (Who and What was discussed)  None     FINAL IMPRESSION     1. Patient left without being seen          DISPOSITION/PLAN   LWBS after Triage    Eloped: The patient decided to leave the ED unsupervised, unnoticed and/or prior to his/her scheduled discharge. PATIENT REFERRED TO:  Follow-up Information    None           DISCHARGE MEDICATIONS:  Current Discharge Medication List            DISCONTINUED MEDICATIONS:  Current Discharge Medication List          I am the Primary Clinician of Record: Felix Galvan MD (electronically signed)    (Please note that parts of this dictation were completed with voice recognition software. Quite often unanticipated grammatical, syntax, homophones, and other interpretive errors are inadvertently transcribed by the computer software. Please disregards these errors.  Please excuse any errors that have escaped final proofreading.)

## 2023-01-28 NOTE — ED TRIAGE NOTES
On arrival pt requesting for staff to make an appointment for him at Dr. Lindsay Orta office. Pt then preceded to ask staff to give a refill on BP medications. Pt states that he has a headache and back pain that started today and did not take anything for. Pt states that he has a hx of both issues. Is asking for motrin for his headache and back and BP meds.

## 2023-01-31 ENCOUNTER — HOSPITAL ENCOUNTER (EMERGENCY)
Age: 43
Discharge: LEFT BEFORE DISCHARGE | End: 2023-01-31
Payer: COMMERCIAL

## 2023-01-31 PROCEDURE — 75810000275 HC EMERGENCY DEPT VISIT NO LEVEL OF CARE

## 2023-02-03 ENCOUNTER — HOSPITAL ENCOUNTER (EMERGENCY)
Age: 43
Discharge: HOME | End: 2023-02-03
Attending: EMERGENCY MEDICINE | Admitting: EMERGENCY MEDICINE
Payer: COMMERCIAL

## 2023-02-03 ENCOUNTER — APPOINTMENT (OUTPATIENT)
Dept: GENERAL RADIOLOGY | Age: 43
End: 2023-02-03
Attending: EMERGENCY MEDICINE
Payer: COMMERCIAL

## 2023-02-03 VITALS
OXYGEN SATURATION: 98 % | RESPIRATION RATE: 18 BRPM | HEART RATE: 78 BPM | SYSTOLIC BLOOD PRESSURE: 153 MMHG | TEMPERATURE: 97.8 F | WEIGHT: 130 LBS | HEIGHT: 65 IN | BODY MASS INDEX: 21.66 KG/M2 | DIASTOLIC BLOOD PRESSURE: 65 MMHG

## 2023-02-03 DIAGNOSIS — R10.84 ABDOMINAL PAIN, GENERALIZED: Primary | ICD-10-CM

## 2023-02-03 DIAGNOSIS — R51.9 NONINTRACTABLE EPISODIC HEADACHE, UNSPECIFIED HEADACHE TYPE: ICD-10-CM

## 2023-02-03 DIAGNOSIS — I10 HYPERTENSION, UNSPECIFIED TYPE: ICD-10-CM

## 2023-02-03 PROCEDURE — 74011250637 HC RX REV CODE- 250/637: Performed by: EMERGENCY MEDICINE

## 2023-02-03 PROCEDURE — 99283 EMERGENCY DEPT VISIT LOW MDM: CPT | Performed by: EMERGENCY MEDICINE

## 2023-02-03 PROCEDURE — 74022 RADEX COMPL AQT ABD SERIES: CPT

## 2023-02-03 PROCEDURE — 74011250636 HC RX REV CODE- 250/636: Performed by: EMERGENCY MEDICINE

## 2023-02-03 RX ORDER — ONDANSETRON 4 MG/1
4 TABLET, ORALLY DISINTEGRATING ORAL
Status: COMPLETED | OUTPATIENT
Start: 2023-02-03 | End: 2023-02-03

## 2023-02-03 RX ORDER — ONDANSETRON 4 MG/1
4 TABLET, ORALLY DISINTEGRATING ORAL
Qty: 14 TABLET | Refills: 0 | Status: SHIPPED | OUTPATIENT
Start: 2023-02-03

## 2023-02-03 RX ORDER — CLONIDINE HYDROCHLORIDE 0.1 MG/1
0.1 TABLET ORAL 2 TIMES DAILY
Qty: 28 TABLET | Refills: 0 | Status: SHIPPED | OUTPATIENT
Start: 2023-02-03 | End: 2023-02-17

## 2023-02-03 RX ORDER — CLONIDINE HYDROCHLORIDE 0.1 MG/1
0.1 TABLET ORAL
Status: COMPLETED | OUTPATIENT
Start: 2023-02-03 | End: 2023-02-03

## 2023-02-03 RX ORDER — IBUPROFEN 600 MG/1
600 TABLET ORAL
Qty: 18 TABLET | Refills: 0 | Status: SHIPPED | OUTPATIENT
Start: 2023-02-03

## 2023-02-03 RX ORDER — IBUPROFEN 400 MG/1
800 TABLET ORAL
Status: COMPLETED | OUTPATIENT
Start: 2023-02-03 | End: 2023-02-03

## 2023-02-03 RX ADMIN — CLONIDINE HYDROCHLORIDE 0.1 MG: 0.1 TABLET ORAL at 20:01

## 2023-02-03 RX ADMIN — IBUPROFEN 800 MG: 400 TABLET, FILM COATED ORAL at 20:01

## 2023-02-03 RX ADMIN — ONDANSETRON 4 MG: 4 TABLET, ORALLY DISINTEGRATING ORAL at 20:00

## 2023-02-04 NOTE — ED PROVIDER NOTES
Pt c/o recurrent abd pain and ha x 3 weeks. Mild/moderate. Took motrin, last 12 hours ago w relief. No vomit/diarrhea. No cp or sob. Had new amlodipine rx 2 months ago, ran out one month ago, no curr bp med. Past Medical History:   Diagnosis Date    Psychiatric disorder     bipolar        History reviewed. No pertinent surgical history. History reviewed. No pertinent family history. Social History     Socioeconomic History    Marital status: SINGLE     Spouse name: Not on file    Number of children: Not on file    Years of education: Not on file    Highest education level: Not on file   Occupational History    Not on file   Tobacco Use    Smoking status: Every Day     Packs/day: 3.00     Types: Cigarettes    Smokeless tobacco: Never   Substance and Sexual Activity    Alcohol use: Not Currently    Drug use: Not Currently    Sexual activity: Not on file   Other Topics Concern    Not on file   Social History Narrative    Not on file     Social Determinants of Health     Financial Resource Strain: Not on file   Food Insecurity: Not on file   Transportation Needs: Not on file   Physical Activity: Not on file   Stress: Not on file   Social Connections: Not on file   Intimate Partner Violence: Not on file   Housing Stability: Not on file         ALLERGIES: Patient has no known allergies. Review of Systems   Constitutional:  Negative for diaphoresis and fever. HENT:  Negative for congestion. Respiratory:  Negative for cough and shortness of breath. Cardiovascular:  Negative for chest pain. Gastrointestinal:  Positive for abdominal pain. Negative for nausea. Musculoskeletal:  Negative for back pain. Skin:  Negative for rash. Neurological:  Negative for dizziness. All other systems reviewed and are negative.     Vitals:    02/03/23 1756 02/03/23 1923 02/03/23 2002   BP: (!) 153/126 (!) 186/105 (!) 161/108   Pulse: (!) 108 (!) 102 (!) 103   Resp: 16 18 18   Temp: 97.8 °F (36.6 °C) SpO2: 98% 99% 99%   Weight: 59 kg (130 lb)     Height: 5' 5\" (1.651 m)              Physical Exam  Vitals and nursing note reviewed. Constitutional:       Appearance: He is well-developed. HENT:      Head: Normocephalic and atraumatic. Eyes:      Conjunctiva/sclera: Conjunctivae normal.   Cardiovascular:      Rate and Rhythm: Normal rate and regular rhythm. Pulmonary:      Effort: Pulmonary effort is normal.      Breath sounds: No wheezing. Abdominal:      Palpations: Abdomen is soft. Tenderness: There is no abdominal tenderness. There is no guarding. Hernia: No hernia is present. Musculoskeletal:         General: No tenderness. Cervical back: Normal range of motion. Skin:     General: Skin is warm and dry. Capillary Refill: Capillary refill takes less than 2 seconds. Findings: No rash. Neurological:      Mental Status: He is alert and oriented to person, place, and time. Psychiatric:         Mood and Affect: Mood normal.        Medical Decision Making  Amount and/or Complexity of Data Reviewed  Radiology: ordered. Risk  Prescription drug management. Procedures      Vitals:  Patient Vitals for the past 12 hrs:   Temp Pulse Resp BP SpO2   02/03/23 2002 -- (!) 103 18 (!) 161/108 99 %   02/03/23 1923 -- (!) 102 18 (!) 186/105 99 %   02/03/23 1756 97.8 °F (36.6 °C) (!) 108 16 (!) 153/126 98 %         Medications ordered:   Medications   cloNIDine HCL (CATAPRES) tablet 0.1 mg (0.1 mg Oral Given 2/3/23 2001)   ibuprofen (MOTRIN) tablet 800 mg (800 mg Oral Given 2/3/23 2001)   ondansetron (ZOFRAN ODT) tablet 4 mg (4 mg Oral Given 2/3/23 2000)         Lab findings:  No results found for this or any previous visit (from the past 12 hour(s)). X-Ray, CT or other radiology findings or impressions:  XR ABD ACUTE W 1 V CHEST   Final Result   No acute cardiopulmonary disease. Nonspecific bowel gas pattern. No   evidence of perforation.                     Progress notes, Consult notes or additional Procedure notes:   9:48 PM pt feels much better, no complaints after motrin/zofran po, bp 148/100 after clonidine po. No emc. No ind for ct or labs, pt wants dc, shared decision making, not c/w acute abd/ich/mass/meningitis/dissection/mal htn/acs. To dc per pt. Detilaed ret inst given. Diagnosis:   1. Abdominal pain, generalized    2. Nonintractable episodic headache, unspecified headache type    3. Hypertension, unspecified type        Disposition: home    Follow-up Information       Follow up With Specialties Details Why Contact Info    [facility] EMERGENCY DEPT Emergency Medicine Go to  As needed, If symptoms worsen [address], NP Nurse Practitioner Schedule an appointment as soon as possible for a visit in 2 days  [address]  [phone]               Patient's Medications   Start Taking    CLONIDINE HCL (CATAPRES) 0.1 MG TABLET    Take 1 Tablet by mouth two (2) times a day for 14 days. IBUPROFEN (MOTRIN) 600 MG TABLET    Take 1 Tablet by mouth every six (6) hours as needed for Pain. ONDANSETRON (ZOFRAN ODT) 4 MG DISINTEGRATING TABLET    Take 1 Tablet by mouth every eight (8) hours as needed for Nausea or Vomiting. Continue Taking    AMLODIPINE (NORVASC) 10 MG TABLET    Take 10 mg by mouth daily.    These Medications have changed    No medications on file   Stop Taking    No medications on file

## 2023-05-18 ENCOUNTER — HOSPITAL ENCOUNTER (EMERGENCY)
Age: 43
Discharge: HOME | End: 2023-05-18
Attending: EMERGENCY MEDICINE
Payer: COMMERCIAL

## 2023-05-18 VITALS
HEIGHT: 65 IN | WEIGHT: 135 LBS | DIASTOLIC BLOOD PRESSURE: 114 MMHG | RESPIRATION RATE: 16 BRPM | OXYGEN SATURATION: 99 % | HEART RATE: 70 BPM | SYSTOLIC BLOOD PRESSURE: 163 MMHG | BODY MASS INDEX: 22.49 KG/M2 | TEMPERATURE: 97.9 F

## 2023-05-18 DIAGNOSIS — L73.9 FOLLICULITIS: Primary | ICD-10-CM

## 2023-05-18 DIAGNOSIS — I10 ESSENTIAL HYPERTENSION: ICD-10-CM

## 2023-05-18 PROCEDURE — 99283 EMERGENCY DEPT VISIT LOW MDM: CPT

## 2023-05-18 RX ORDER — AMLODIPINE BESYLATE 10 MG/1
10 TABLET ORAL DAILY
COMMUNITY
Start: 2022-12-11 | End: 2023-05-18 | Stop reason: SDUPTHER

## 2023-05-18 RX ORDER — AMLODIPINE BESYLATE 10 MG/1
10 TABLET ORAL DAILY
Qty: 30 TABLET | Refills: 0 | Status: SHIPPED | OUTPATIENT
Start: 2023-05-18

## 2023-05-18 ASSESSMENT — ENCOUNTER SYMPTOMS
SHORTNESS OF BREATH: 0
ABDOMINAL PAIN: 0

## 2023-05-18 ASSESSMENT — PAIN - FUNCTIONAL ASSESSMENT: PAIN_FUNCTIONAL_ASSESSMENT: 0-10

## 2023-05-18 ASSESSMENT — PAIN SCALES - GENERAL: PAINLEVEL_OUTOF10: 4

## 2023-05-18 NOTE — DISCHARGE INSTRUCTIONS
It was a pleasure taking care of you in our Emergency Department today. We know that when you come to [facility], you are entrusting us with your health, comfort, and safety. Our physicians and nurses honor that trust, and truly appreciate the opportunity to care for you and your loved ones. We also value your feedback. If you receive a survey about your Emergency Department experience today, please fill it out. We care about our patients' feedback, and we listen to what you have to say. Please read over your discharge instructions as these contain pertinent information to help you in the healing process. These instructions include a list of prescriptions you were given today. Follow-up information is also noted on your discharge papers. There are attached instructions and information pertaining to the reason why you were seen in the emergency department today. These discharge instructions may not be for exactly why you were here, but may be the closest available instructions that we have. These include important advice for things that you can do at home to feel better, and reasons to return to the emergency department. The evaluation and treatment you received in the emergency department is not always definitive care. If follow-up with your primary care doctor or specialist was recommended, it is important that you make these appointments for follow-up care. You may need further testing, procedures, and/or medications to help you feel better. Further tests may be required that are not available in the emergency department. Failure to make these follow-up appointments may jeopardize your health. The emergency department is here for emergent stabilization and evaluation of life and limb threatening illness and/or injuries. Further care through a specialist or primary care doctor may be required to assist in your healing and complete your treatment and/or evaluation.   We may not always be

## 2023-05-18 NOTE — ED TRIAGE NOTES
Pt states he got a beard shave about a week ago, states he broke out in a lot of bumps after and he has had one bump that keeps getting bigger. Area is not draining.

## 2023-05-18 NOTE — ED PROVIDER NOTES
a voice recognition program.  Efforts were made to edit the dictations but occasionally words are mis-transcribed.)         José Martin IV, MD  05/18/23 0622

## 2023-08-16 ENCOUNTER — HOSPITAL ENCOUNTER (EMERGENCY)
Age: 43
Discharge: HOME | End: 2023-08-16
Attending: FAMILY MEDICINE
Payer: COMMERCIAL

## 2023-08-16 VITALS
TEMPERATURE: 98 F | SYSTOLIC BLOOD PRESSURE: 119 MMHG | RESPIRATION RATE: 14 BRPM | BODY MASS INDEX: 21.49 KG/M2 | HEIGHT: 65 IN | DIASTOLIC BLOOD PRESSURE: 68 MMHG | WEIGHT: 129 LBS | OXYGEN SATURATION: 100 % | HEART RATE: 75 BPM

## 2023-08-16 DIAGNOSIS — L70.9 ACNE, UNSPECIFIED ACNE TYPE: Primary | ICD-10-CM

## 2023-08-16 PROCEDURE — 99283 EMERGENCY DEPT VISIT LOW MDM: CPT

## 2023-08-16 RX ORDER — DOXYCYCLINE HYCLATE 100 MG
100 TABLET ORAL 2 TIMES DAILY
Qty: 10 TABLET | Refills: 0 | Status: SHIPPED | OUTPATIENT
Start: 2023-08-16 | End: 2023-08-21

## 2023-08-16 NOTE — DISCHARGE INSTRUCTIONS
As we spoke this is either the beginning of a small abscess or acne. I will call you in some antibiotics at this point. Does not warrant opening up. I did give you Dr. Gabrielle Lyle phone number she is taken unassigned call. Give this office a call and schedule an appointment to have a follow-up. Could also follow-up with Dr. Renetta Wang who is a dermatologist have also given you that number.

## 2023-08-16 NOTE — ED PROVIDER NOTES
[facility] EMERGENCY DEPT  EMERGENCY DEPARTMENT ENCOUNTER      Pt Name: Carroll Arzate  MRN: [id number]  [address] [facility] [place] 1980  Date of evaluation: 8/16/2023  Provider: Esther Valdez DO    CHIEF COMPLAINT       Chief Complaint   Patient presents with    Acne         HISTORY OF PRESENT ILLNESS   (Location/Symptom, Timing/Onset, Context/Setting, Quality, Duration, Modifying Factors, Severity)  Note limiting factors. Carroll Arzate is a 43 y.o. male who presents to the emergency department patient comes in stating he has been having a bump on the left side of his jaw for several days. He states he has been picking at it and to get bigger. Denies any fevers. States he had little bit of drainage from it. But not a whole lot. Has had abscesses in the past.  Has not taken anything for    HPI    Nursing Notes were reviewed. REVIEW OF SYSTEMS    (2-9 systems for level 4, 10 or more for level 5)     Review of Systems   Skin:  Positive for wound. All other systems reviewed and are negative. Except as noted above the remainder of the review of systems was reviewed and negative. PAST MEDICAL HISTORY     Past Medical History:   Diagnosis Date    Psychiatric disorder     bipolar          SURGICAL HISTORY     No past surgical history on file. CURRENT MEDICATIONS       Previous Medications    AMLODIPINE (NORVASC) 10 MG TABLET    Take 1 tablet by mouth daily       ALLERGIES     Haldol [haloperidol]    FAMILY HISTORY     No family history on file.        SOCIAL HISTORY       Social History     Socioeconomic History    Marital status: Single   Tobacco Use    Smoking status: Every Day     Packs/day: 3.00     Types: Cigarettes    Smokeless tobacco: Never   Substance and Sexual Activity    Alcohol use: Not Currently    Drug use: Not Currently       SCREENINGS                                                 PHYSICAL EXAM    (up to 7 for level 4, 8 or more for level 5)     ED Triage Vitals   BP Temp Temp src Pulse Resp

## 2023-08-16 NOTE — ED NOTES
Chief Complaint  Anxiety/depression, weight management    SUBJECTIVE  Digna Baron presents to [facility]     Pt here today for 3 month follow up on weight loss with Topamax.  Patient has lost approximately 11 pounds since starting the medication, reports tolerating well and would like to continue.    Pt would like to discuss Zoloft, states was prescribed this by oncology for anxiety depression related to the breast cancer/tamoxifen therapy, states that recently her dose was increased and since then she has been noticing some loose stools, states that she typically has bowel movement every day and will have some loose stool and some formed stool, states that it is not particularly bothersome but wants to know if this could be from the Zoloft.  Does not necessarily wish to stop the medication    Pt states got sick in May, lost her voice/had laryngitis, voice has never fully recovered. Requesting ENT referral.  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 He's been clean most of my life    Cancer Maternal Grandfather         Bladder cancer    Diabetes Maternal Grandfather         Extreme kidney failure. One at 0% function    Heart disease Maternal Grandfather         Heart attack when he was 50    Kidney disease Maternal Grandfather     Cancer Paternal Grandmother         Breast cancer    Cancer Paternal Aunt         Gall bladder cancer    Early death Paternal Aunt         Passed from the cancer at 41 yo    Thyroid disease  I have reviewed discharge instructions with the patient. The patient verbalized understanding. Patient looks comfortable, left ED in stable condition. Patient ambulatory, steady gait noted. Vital signs stable upon discharge. No acute distress noted, no new complaints noted at this time. Patient armband removed and given to patient to take home. Patient was informed of the privacy risks if armband lost or stolen. "Daughter         Hypothyroidism      Past Surgical History:   Procedure Laterality Date    BREAST SURGERY  2018    Bilateral Mastectomy and implant exchange in May 2019     SECTION  1996    Daughter's Birth    COLONOSCOPY N/A 2023    Procedure: COLONOSCOPY;  Surgeon: Tylor Bahena MD;  Location: [facility] ENDOSCOPY;  Service: General;  Laterality: N/A;  normal colonoscopy    LYMPH NODE BIOPSY  2018    Breast cancer    US GUIDED LYMPH NODE BIOPSY  8/10/2023        Current Outpatient Medications:     BIOTIN FORTE PO, Take  by mouth., Disp: , Rfl:     Ferrous Sulfate Dried (FERROUS SULFATE IRON PO), Take  by mouth., Disp: , Rfl:     fluticasone (FLONASE) 50 MCG/ACT nasal spray, 2 sprays into the nostril(s) as directed by provider Daily., Disp: 1 g, Rfl: 3    Multiple Vitamins-Minerals (ZINC PO), Take  by mouth., Disp: , Rfl:     Omega-3 Fatty Acids (fish oil) 1000 MG capsule capsule, Take  by mouth Daily With Breakfast., Disp: , Rfl:     sertraline (ZOLOFT) 25 MG tablet, Take 3 tablets by mouth Daily., Disp: 90 tablet, Rfl: 3    tamoxifen (NOLVADEX) 10 MG tablet, Take 2 tablets by mouth Daily., Disp: 90 tablet, Rfl: 1    vitamin C (ASCORBIC ACID) 250 MG tablet, Take 1 tablet by mouth Daily., Disp: , Rfl:     Zinc Acetate, Oral, (ZINC ACETATE PO), Take  by mouth., Disp: , Rfl:     topiramate (Topamax) 25 MG tablet, 1 tab PO BID, Disp: 60 tablet, Rfl: 5    OBJECTIVE  Vital Signs:   /59   Pulse 61   Ht 165.1 cm (65\")   Wt 72.1 kg (159 lb)   SpO2 99%   BMI 26.46 kg/mý    Estimated body mass index is 26.46 kg/mý as calculated from the following:    Height as of this encounter: 165.1 cm (65\").    Weight as of this encounter: 72.1 kg (159 lb).     Wt Readings from Last 3 Encounters:   23 72.1 kg (159 lb)   23 71.9 kg (158 lb 9.6 oz)   23 71.3 kg (157 lb 3 oz)     BP Readings from Last 3 Encounters:   08/16/23 117/59   23 110/78   23 105/62       Physical " Exam  Vitals reviewed.   Constitutional:       Appearance: Normal appearance. She is well-developed.   HENT:      Head: Normocephalic and atraumatic.      Right Ear: External ear normal.      Left Ear: External ear normal.   Eyes:      Conjunctiva/sclera: Conjunctivae normal.      Pupils: Pupils are equal, round, and reactive to light.   Cardiovascular:      Rate and Rhythm: Normal rate and regular rhythm.      Heart sounds: No murmur heard.    No friction rub. No gallop.   Pulmonary:      Effort: Pulmonary effort is normal.      Breath sounds: Normal breath sounds. No wheezing or rhonchi.   Skin:     General: Skin is warm and dry.   Neurological:      Mental Status: She is alert and oriented to person, place, and time.      Cranial Nerves: No cranial nerve deficit.   Psychiatric:         Mood and Affect: Mood and affect normal.         Behavior: Behavior normal.         Thought Content: Thought content normal.         Judgment: Judgment normal.        Result Review    CMP          10/17/2022    13:13   CMP   Glucose 100    BUN 13    Creatinine 0.93    EGFR 76.4    Sodium 140    Potassium 3.7    Chloride 105    Calcium 9.3    Total Protein 6.5    Albumin 4.10    Globulin 2.4    Total Bilirubin <0.2    Alkaline Phosphatase 65    AST (SGOT) 30    ALT (SGPT) 28    Albumin/Globulin Ratio 1.7    BUN/Creatinine Ratio 14.0    Anion Gap 7.2      CBC          10/17/2022    13:13 7/24/2023    13:47   CBC   WBC 7.36  5.85    RBC 4.17  4.16    Hemoglobin 13.0  13.0    Hematocrit 39.7  39.2    MCV 95.2  94.2    MCH 31.2  31.3    MCHC 32.7  33.2    RDW 12.5  12.2    Platelets 159  185      Lipid Panel          10/17/2022    13:13   Lipid Panel   Total Cholesterol 151    Triglycerides 62    HDL Cholesterol 96    VLDL Cholesterol 13    LDL Cholesterol  42    LDL/HDL Ratio 0.44      TSH          10/17/2022    13:13   TSH   TSH 2.390      Most Recent A1C          10/18/2022    12:03   HGBA1C Most Recent   Hemoglobin A1C 5.20         XR Chest 2 View    Result Date: 7/24/2023    1.2 cm nodular opacity at the left lung base.  Consider CT scan for further evaluation.      DANIELLE TANNER MD       Electronically Signed and Approved By: DANIELLE TANNER MD on 7/24/2023 at 14:37             XR Forearm 2 View Left    Result Date: 6/14/2023  No acute process. Authenticated and Electronically Signed by Anjel Anne III, MD on 06/14/2023 07:58:13 AM    CT Chest With Contrast Diagnostic    Result Date: 8/9/2023   No suspicious pulmonary nodule demonstrated     IMANI SHEETS MD       Electronically Signed and Approved By: IMANI SHEETS MD on 8/09/2023 at 11:35             Mammo Post Device Placement Left    Result Date: 8/10/2023    Diagnostic left mammogram demonstrating localization clip in satisfactory position.     DANIELLE TANNER MD       Electronically Signed and Approved By: DANIELLE TANNER MD on 8/10/2023 at 9:38             US Guided Lymph Node Biopsy    Addendum Date: 8/14/2023    ADDENDUM:  COMPARISON: Other, MR, MRI BREAST BILATERAL W WO CONTRAST, 12/05/2018, 14:39.  Final surgical pathology report describes benign reactive lymph node.  Imaging findings of pathology findings are concordant.  Consider follow-up targeted ultrasound in 6 months.    DANIELLE TANNER MD       Electronically Signed and Approved By: DANIELLE TANNER MD on 8/14/2023 at 18:05             Result Date: 8/14/2023    Uneventful ultrasound-guided core needle biopsy, left axillary lymph node with clip placement, as above.      DANIELLE TANNER MD       Electronically Signed and Approved By: DANIELLE TANNER MD on 8/10/2023 at 9:35             US Axilla Left    Result Date: 7/26/2023  2 left axillary lymph nodes with areas of mild focal cortical thickening measuring up to 5 mm.  The patient had a chest radiograph on 7/24/2023 recommending a follow-up CT scan of the chest for a 1.2 cm nodular opacity in the left lung base.  Suggest ultrasound-guided biopsy of the left axillary  nodes if they are asymmetrically enlarged compared to the right axillary nodes on the CT scan of the chest.   MACKENZIE FRENCH MD       Electronically Signed and Approved By: MACKENZIE FRENCH MD on 7/26/2023 at 15:32                The above data has been reviewed by POLA Campbell 08/16/2023 12:02 EDT.          Patient Care Team:  Brenda Ying APRN as PCP - General (Nurse Practitioner)  Imani Lopez, COLETTE as Nurse Navigator            ASSESSMENT & PLAN    Diagnoses and all orders for this visit:    1. Hoarseness, chronic (Primary)  -     Ambulatory Referral to ENT (Otolaryngology)    2. Anxiety and depression  Assessment & Plan:  We discussed that diarrhea can be a side effect of Zoloft, we discussed potentially stopping /switching the medication, for now patient desires to continue the medication she will monitor her symptoms, if there are any new or worsening symptoms, symptoms do not resolve, she will follow-up for further discussion      3. Overweight (BMI 25.0-29.9)  Assessment & Plan:  Down approximately 11 pounds since starting Topamax, patient will continue current dose and continue to work on healthy diet and exercise    Orders:  -     topiramate (Topamax) 25 MG tablet; 1 tab PO BID  Dispense: 60 tablet; Refill: 5         Tobacco Use: Medium Risk    Smoking Tobacco Use: Former    Smokeless Tobacco Use: Never    Passive Exposure: Not on file       Follow Up     Return in about 6 months (around 2/16/2024), or if symptoms worsen or fail to improve.        Patient was given instructions and counseling regarding her condition or for health maintenance advice. Please see specific information pulled into the AVS if appropriate.   I have reviewed information obtained and documented by others and I have confirmed the accuracy of this documented note.    POLA Campbell

## 2023-08-23 ENCOUNTER — HOSPITAL ENCOUNTER (EMERGENCY)
Age: 43
Discharge: LEFT BEFORE DISCHARGE | End: 2023-08-23
Attending: EMERGENCY MEDICINE

## 2023-08-23 VITALS
TEMPERATURE: 97.9 F | RESPIRATION RATE: 18 BRPM | SYSTOLIC BLOOD PRESSURE: 169 MMHG | OXYGEN SATURATION: 98 % | BODY MASS INDEX: 21.49 KG/M2 | DIASTOLIC BLOOD PRESSURE: 107 MMHG | HEIGHT: 65 IN | HEART RATE: 71 BPM | WEIGHT: 129 LBS

## 2023-08-23 ASSESSMENT — PAIN SCALES - GENERAL: PAINLEVEL_OUTOF10: 0

## 2023-08-23 ASSESSMENT — LIFESTYLE VARIABLES
HOW MANY STANDARD DRINKS CONTAINING ALCOHOL DO YOU HAVE ON A TYPICAL DAY: PATIENT DOES NOT DRINK
HOW OFTEN DO YOU HAVE A DRINK CONTAINING ALCOHOL: NEVER

## 2023-08-23 ASSESSMENT — PAIN - FUNCTIONAL ASSESSMENT: PAIN_FUNCTIONAL_ASSESSMENT: 0-10

## 2023-08-28 ENCOUNTER — HOSPITAL ENCOUNTER (EMERGENCY)
Age: 43
Discharge: LEFT BEFORE DISCHARGE | End: 2023-08-28
Attending: EMERGENCY MEDICINE

## 2023-08-28 VITALS
RESPIRATION RATE: 16 BRPM | TEMPERATURE: 97.7 F | BODY MASS INDEX: 21.49 KG/M2 | HEIGHT: 65 IN | OXYGEN SATURATION: 97 % | DIASTOLIC BLOOD PRESSURE: 98 MMHG | HEART RATE: 71 BPM | WEIGHT: 129 LBS | SYSTOLIC BLOOD PRESSURE: 140 MMHG

## 2023-08-28 DIAGNOSIS — H66.92 LEFT OTITIS MEDIA, UNSPECIFIED OTITIS MEDIA TYPE: Primary | ICD-10-CM

## 2023-08-28 PROCEDURE — 4500000002 HC ER NO CHARGE

## 2023-08-28 RX ORDER — AMOXICILLIN 500 MG/1
500 CAPSULE ORAL 2 TIMES DAILY
Qty: 14 CAPSULE | Refills: 0 | Status: SHIPPED | OUTPATIENT
Start: 2023-08-28 | End: 2023-09-04

## 2023-08-28 RX ORDER — KETOROLAC TROMETHAMINE 10 MG/1
10 TABLET, FILM COATED ORAL
Qty: 12 TABLET | Refills: 0 | Status: SHIPPED | OUTPATIENT
Start: 2023-08-28

## 2023-08-28 ASSESSMENT — PAIN - FUNCTIONAL ASSESSMENT: PAIN_FUNCTIONAL_ASSESSMENT: 0-10

## 2023-08-28 ASSESSMENT — PAIN SCALES - GENERAL: PAINLEVEL_OUTOF10: 0

## 2023-08-28 NOTE — ED NOTES
Informed pt that ED was extremely busy, states \"well I cant stay long anyway\"     Clinton Hernandez, RICHI  08/28/23 1817

## 2023-08-28 NOTE — ED TRIAGE NOTES
Pt states he needs his meds refilled and his left ear checked out. Pt is unsure what the name of the med he needs refilled is, states he has been out of it about a week, but he does not know the name of it. ....   C/O left ear pain x 3 days

## 2023-10-23 ENCOUNTER — HOSPITAL ENCOUNTER (EMERGENCY)
Age: 43
Discharge: HOME | End: 2023-10-23
Attending: FAMILY MEDICINE
Payer: COMMERCIAL

## 2023-10-23 VITALS
DIASTOLIC BLOOD PRESSURE: 109 MMHG | HEART RATE: 87 BPM | SYSTOLIC BLOOD PRESSURE: 151 MMHG | WEIGHT: 133 LBS | TEMPERATURE: 98.3 F | RESPIRATION RATE: 17 BRPM | HEIGHT: 65 IN | OXYGEN SATURATION: 100 % | BODY MASS INDEX: 22.16 KG/M2

## 2023-10-23 DIAGNOSIS — R51.9 NONINTRACTABLE HEADACHE, UNSPECIFIED CHRONICITY PATTERN, UNSPECIFIED HEADACHE TYPE: Primary | ICD-10-CM

## 2023-10-23 PROCEDURE — 99283 EMERGENCY DEPT VISIT LOW MDM: CPT

## 2023-10-23 PROCEDURE — 6370000000 HC RX 637 (ALT 250 FOR IP): Performed by: FAMILY MEDICINE

## 2023-10-23 RX ORDER — RISPERIDONE 3 MG/1
3 TABLET ORAL EVERY EVENING
COMMUNITY
Start: 2022-12-11

## 2023-10-23 RX ORDER — IBUPROFEN 400 MG/1
400 TABLET ORAL
Status: COMPLETED | OUTPATIENT
Start: 2023-10-23 | End: 2023-10-23

## 2023-10-23 RX ADMIN — IBUPROFEN 400 MG: 400 TABLET, FILM COATED ORAL at 14:49

## 2023-10-23 ASSESSMENT — PAIN DESCRIPTION - LOCATION
LOCATION: ABDOMEN;HEAD
LOCATION: HEAD;ABDOMEN

## 2023-10-23 ASSESSMENT — PAIN SCALES - GENERAL
PAINLEVEL_OUTOF10: 8
PAINLEVEL_OUTOF10: 8

## 2023-10-23 ASSESSMENT — PAIN - FUNCTIONAL ASSESSMENT: PAIN_FUNCTIONAL_ASSESSMENT: 0-10

## 2023-10-23 ASSESSMENT — LIFESTYLE VARIABLES
HOW OFTEN DO YOU HAVE A DRINK CONTAINING ALCOHOL: NEVER
HOW MANY STANDARD DRINKS CONTAINING ALCOHOL DO YOU HAVE ON A TYPICAL DAY: PATIENT DOES NOT DRINK

## 2023-10-23 ASSESSMENT — PAIN DESCRIPTION - PAIN TYPE: TYPE: OTHER (COMMENT)

## 2023-10-23 NOTE — ED TRIAGE NOTES
Pt reports stomach ache and headache, when asked when symptoms began pt is unsure, denies any other symptoms at this time.

## 2023-10-23 NOTE — ED PROVIDER NOTES
[facility] EMERGENCY DEPT  EMERGENCY DEPARTMENT ENCOUNTER      Pt Name: Ana Smallwood  MRN: [id number]  [address] [facility] 1980  Date of evaluation: 10/23/2023  Provider: Ondina Macias DO    CHIEF COMPLAINT       Chief Complaint   Patient presents with    Headache         HISTORY OF PRESENT ILLNESS   (Location/Symptom, Timing/Onset, Context/Setting, Quality, Duration, Modifying Factors, Severity)  Note limiting factors. Ana Smallwood is a 37 y.o. male who presents to the emergency department patient comes in stating he is having a headache he has had headaches all of his life Motrin works really good for headaches however he has not taken anything because he has no money to buy denies any problems with the lights or noise rates his headache is currently a 7 out of 10 when asked about his headache pain he states this feels like a bee sting in the middle of his forehead. Denies any chest pains or shortness of breath denies any nausea vomiting states he has not eaten in a couple days because he ran out of food stamps. States sometimes he is got difficulties with urinating but no pain with urination today states he does not like to drink water. But drinks Pepsi    HPI    Nursing Notes were reviewed. REVIEW OF SYSTEMS    (2-9 systems for level 4, 10 or more for level 5)     Review of Systems   Neurological:  Positive for headaches. All other systems reviewed and are negative. Except as noted above the remainder of the review of systems was reviewed and negative. PAST MEDICAL HISTORY     Past Medical History:   Diagnosis Date    Hypertension     Psychiatric disorder     bipolar          SURGICAL HISTORY     History reviewed. No pertinent surgical history.       CURRENT MEDICATIONS       Previous Medications    AMLODIPINE (NORVASC) 10 MG TABLET    Take 1 tablet by mouth daily    RISPERIDONE (RISPERDAL) 3 MG TABLET    Take 1 tablet by mouth every evening       ALLERGIES     Haloperidol    FAMILY HISTORY

## 2023-12-22 ENCOUNTER — HOSPITAL ENCOUNTER (EMERGENCY)
Age: 43
Discharge: HOME | End: 2023-12-22

## 2023-12-24 ENCOUNTER — HOSPITAL ENCOUNTER (EMERGENCY)
Age: 43
Discharge: HOME | End: 2023-12-24
Attending: EMERGENCY MEDICINE
Payer: COMMERCIAL

## 2023-12-24 VITALS — OXYGEN SATURATION: 100 % | TEMPERATURE: 99 F | HEART RATE: 79 BPM | RESPIRATION RATE: 18 BRPM

## 2023-12-24 DIAGNOSIS — Z00.00 GENERAL MEDICAL EXAMINATION: Primary | ICD-10-CM

## 2023-12-24 PROCEDURE — 99282 EMERGENCY DEPT VISIT SF MDM: CPT

## 2023-12-24 NOTE — ED TRIAGE NOTES
Patient ambulatory onto unit with belongings, states has had neck pain for 10 years. Requesting to go to the cafeteria for fried chicken, patient made aware that the cafeteria is not open today due to holiday and a Sunday. Attempting to get vital signs and do triage assessment. Patient then takes off blood pressure cuff, states it is to tight and that he would like to leave. Cuff was on and not inflated. That he has a prescription of ibuprofen from [facility]. MD at bedside attempting to obtain history, patient  Patient gathers his belongings and walks off unit. No distress noted.

## 2023-12-24 NOTE — ED PROVIDER NOTES
[facility] EMERGENCY DEPT  EMERGENCY DEPARTMENT ENCOUNTER      Pt Name: Arelis Haskins  MRN: [id number]  [address] [facility] [place] 1980  Date of evaluation: 12/24/2023  Provider: Nicolas Rudd MD  3:06 PM    CHIEF COMPLAINT       Chief Complaint   Patient presents with    Neck Pain         HISTORY OF PRESENT ILLNESS    Arelis Haskins is a 37 y.o. male who presents to the emergency department for evaluation of chronic neck pain. Patient was asked about his visit today, during which time he was having his blood pressure cuff placed on his left arm. Patient decided that it was too tight and would inhibit his ability to be medically examined and then left without any further history. The history is provided by the patient and medical records. Nursing Notes were reviewed. REVIEW OF SYSTEMS       Review of Systems   All other systems reviewed and are negative. Except as noted above the remainder of the review of systems was reviewed and negative. PAST MEDICAL HISTORY     Past Medical History:   Diagnosis Date    Hypertension     Psychiatric disorder     bipolar          SURGICAL HISTORY     No past surgical history on file. CURRENT MEDICATIONS       Previous Medications    AMLODIPINE (NORVASC) 10 MG TABLET    Take 1 tablet by mouth daily    RISPERIDONE (RISPERDAL) 3 MG TABLET    Take 1 tablet by mouth every evening       ALLERGIES     Haloperidol    FAMILY HISTORY     No family history on file.        SOCIAL HISTORY       Social History     Socioeconomic History    Marital status: Single   Tobacco Use    Smoking status: Some Days     Current packs/day: 3.00     Types: Cigarettes    Smokeless tobacco: Never   Vaping Use    Vaping Use: Never used   Substance and Sexual Activity    Alcohol use: Not Currently    Drug use: Not Currently       SCREENINGS         Ania Coma Scale  Eye Opening: Spontaneous  Best Verbal Response: Oriented  Best Motor Response: Obeys commands  Ania Coma Scale Score: 15

## 2023-12-28 ENCOUNTER — HOSPITAL ENCOUNTER (EMERGENCY)
Age: 43
Discharge: LEFT BEFORE DISCHARGE | End: 2023-12-28
Attending: INTERNAL MEDICINE

## 2023-12-28 VITALS
BODY MASS INDEX: 22.13 KG/M2 | HEART RATE: 76 BPM | HEIGHT: 65 IN | TEMPERATURE: 98.3 F | OXYGEN SATURATION: 98 % | DIASTOLIC BLOOD PRESSURE: 104 MMHG | RESPIRATION RATE: 18 BRPM | SYSTOLIC BLOOD PRESSURE: 179 MMHG

## 2023-12-28 DIAGNOSIS — Z53.21 PATIENT LEFT WITHOUT BEING SEEN: Primary | ICD-10-CM

## 2023-12-28 NOTE — ED NOTES
Patient advised nursing staff that he was leaving ER to go see his mother. Patient has not returned to ER. MD aware.

## 2023-12-28 NOTE — ED TRIAGE NOTES
Patient c/o itchy rash to chest wall for period greater than a month. States burning sensation to skin.

## 2024-01-06 ENCOUNTER — HOSPITAL ENCOUNTER (EMERGENCY)
Age: 44
Discharge: HOME | End: 2024-01-06
Attending: FAMILY MEDICINE
Payer: COMMERCIAL

## 2024-01-06 VITALS
TEMPERATURE: 99.1 F | RESPIRATION RATE: 18 BRPM | HEIGHT: 65 IN | HEART RATE: 96 BPM | BODY MASS INDEX: 22.13 KG/M2 | OXYGEN SATURATION: 97 %

## 2024-01-06 DIAGNOSIS — R59.0 SUBMANDIBULAR LYMPHADENOPATHY: Primary | ICD-10-CM

## 2024-01-06 PROCEDURE — 99283 EMERGENCY DEPT VISIT LOW MDM: CPT

## 2024-01-06 RX ORDER — AMOXICILLIN AND CLAVULANATE POTASSIUM 875; 125 MG/1; MG/1
1 TABLET, FILM COATED ORAL 2 TIMES DAILY
Qty: 14 TABLET | Refills: 0 | Status: SHIPPED | OUTPATIENT
Start: 2024-01-06 | End: 2024-01-13

## 2024-01-06 ASSESSMENT — PAIN - FUNCTIONAL ASSESSMENT: PAIN_FUNCTIONAL_ASSESSMENT: NONE - DENIES PAIN

## 2024-01-06 NOTE — ED PROVIDER NOTES
[facility] EMERGENCY DEPT  EMERGENCY DEPARTMENT ENCOUNTER      Pt Name: Cata Mckeon  MRN: [id number]  Birthdate 1980  Date of evaluation: 1/6/2024  Provider: Jeancarlos Gruber DO  3:03 PM    CHIEF COMPLAINT       Chief Complaint   Patient presents with    Neck Swelling         HISTORY OF PRESENT ILLNESS    Cata Mckeon is a 43 y.o. male who presents to the emergency department patient comes in stating that 4 days ago he noticed that he had a lump underneath his neck.  Is not getting any bigger or not getting any smaller.  Denies any difficulties with swallowing or breathing.  He does state he has some pain in his posterior jaw.  He has been trying to get into a dentist.  But he has been getting the run around and difficulties trying to take the Medicaid taxi to see them.  He does admit that he does brush his teeth but sometimes forgets.  Denies any fevers.    HPI    Nursing Notes were reviewed.    REVIEW OF SYSTEMS       Review of Systems   Musculoskeletal:  Positive for neck pain.   All other systems reviewed and are negative.      Except as noted above the remainder of the review of systems was reviewed and negative.       PAST MEDICAL HISTORY     Past Medical History:   Diagnosis Date    Hypertension     Psychiatric disorder     bipolar          SURGICAL HISTORY     No past surgical history on file.      CURRENT MEDICATIONS       Previous Medications    No medications on file       ALLERGIES     Haloperidol and Seroquel [quetiapine]    FAMILY HISTORY     No family history on file.       SOCIAL HISTORY       Social History     Socioeconomic History    Marital status: Single   Tobacco Use    Smoking status: Some Days     Current packs/day: 3.00     Types: Cigarettes    Smokeless tobacco: Never   Vaping Use    Vaping Use: Never used   Substance and Sexual Activity    Alcohol use: Not Currently    Drug use: Not Currently       SCREENINGS         Ania Coma Scale  Eye Opening: Spontaneous  Best Verbal Response:  with a voice recognition program.  Efforts were made to edit the dictations but occasionally words are mis-transcribed.)    Jeancarlos Gruber DO (electronically signed)  Attending Emergency Physician           Jeancarlos Gruber DO  01/06/24 0848

## 2024-01-06 NOTE — ED TRIAGE NOTES
Patient with swollen lymph node to right front of neck x 4 days, patient denies pain, fever     No issues with respirations, no shortness of breath or sore throat

## 2024-01-06 NOTE — DISCHARGE INSTRUCTIONS
As we spoke, this may be related to your dental issues.  Or may be a stone in one of your saliva producing glands.  I want you to suck on a few anum and see if this does not improve this.  If it does not I want you to take the Augmentin that I have written for you.  Take this as prescribed.  Tylenol ibuprofen for any pains return to the emergency department if you have any difficulties with swallowing or breathing.  Attempt to get up with your dentist.  You can follow-up with your primary care doctor if you do not have 1 I have given you the name of Dr. Rizo give his office a call and schedule an appointment

## 2024-03-20 ENCOUNTER — HOSPITAL ENCOUNTER (EMERGENCY)
Age: 44
Discharge: HOME | End: 2024-03-20
Attending: EMERGENCY MEDICINE
Payer: COMMERCIAL

## 2024-03-20 VITALS
DIASTOLIC BLOOD PRESSURE: 104 MMHG | HEART RATE: 83 BPM | BODY MASS INDEX: 20.99 KG/M2 | TEMPERATURE: 97.6 F | HEIGHT: 65 IN | WEIGHT: 126 LBS | SYSTOLIC BLOOD PRESSURE: 142 MMHG | OXYGEN SATURATION: 100 % | RESPIRATION RATE: 16 BRPM

## 2024-03-20 DIAGNOSIS — S80.861A INSECT BITE OF RIGHT LOWER EXTREMITY, INITIAL ENCOUNTER: Primary | ICD-10-CM
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
## 2024-03-20 PROCEDURE — 6370000000 HC RX 637 (ALT 250 FOR IP): Performed by: EMERGENCY MEDICINE

## 2024-03-20 PROCEDURE — 99283 EMERGENCY DEPT VISIT LOW MDM: CPT

## 2024-03-20 RX ORDER — GINSENG 100 MG
CAPSULE ORAL
Status: COMPLETED | OUTPATIENT
Start: 2024-03-20 | End: 2024-03-20

## 2024-03-20 RX ADMIN — BACITRACIN 1 EACH: 500 OINTMENT TOPICAL at 18:10

## 2024-03-20 ASSESSMENT — PAIN SCALES - GENERAL: PAINLEVEL_OUTOF10: 0

## 2024-03-20 ASSESSMENT — PAIN - FUNCTIONAL ASSESSMENT: PAIN_FUNCTIONAL_ASSESSMENT: 0-10

## 2024-03-20 NOTE — ED PROVIDER NOTES
[facility] EMERGENCY DEPT  EMERGENCY DEPARTMENT HISTORY AND PHYSICAL EXAM      Date: 3/20/2024  Patient Name: Cata Mckeon  MRN: [id number]  Birthdate 1980  Date of evaluation: 3/20/2024  Provider: Florentin Macedo MD   Note Started: 6:04 PM EDT 3/20/24    HISTORY OF PRESENT ILLNESS     Chief Complaint   Patient presents with    Insect Bite       History Provided By: Patient    HPI: Cata Mckeon is a 43 y.o. male presenting with bug bite left leg. Noted last night taking shower. Tender. No drainage no significant swelling.     PAST MEDICAL HISTORY   Past Medical History:  Past Medical History:   Diagnosis Date    Hypertension     Psychiatric disorder     bipolar        Past Surgical History:  History reviewed. No pertinent surgical history.    Family History:  History reviewed. No pertinent family history.    Social History:  Social History     Tobacco Use    Smoking status: Some Days     Current packs/day: 3.00     Types: Cigarettes    Smokeless tobacco: Never   Vaping Use    Vaping Use: Never used   Substance Use Topics    Alcohol use: Not Currently    Drug use: Not Currently       Allergies:  Allergies   Allergen Reactions    Haloperidol Hives, Itching and Other (See Comments)     Other reaction(s): Unknown      Seroquel [Quetiapine] Other (See Comments)     Unknown         PCP: None, None    Current Meds:   No current facility-administered medications for this encounter.     No current outpatient medications on file.       Social Determinants of Health:   Social Determinants of Health     Tobacco Use: High Risk (3/20/2024)    Patient History     Smoking Tobacco Use: Some Days     Smokeless Tobacco Use: Never     Passive Exposure: Not on file   Alcohol Use: Not At Risk (3/20/2024)    AUDIT-C     Frequency of Alcohol Consumption: Never     Average Number of Drinks: Patient does not drink     Frequency of Binge Drinking: Never   Financial Resource Strain: Not on file   Food Insecurity: Not on file

## 2024-03-26 ENCOUNTER — HOSPITAL ENCOUNTER (EMERGENCY)
Age: 44
Discharge: HOME | End: 2024-03-26
Attending: EMERGENCY MEDICINE
Payer: COMMERCIAL

## 2024-03-26 VITALS
HEART RATE: 94 BPM | SYSTOLIC BLOOD PRESSURE: 156 MMHG | WEIGHT: 131 LBS | HEIGHT: 65 IN | TEMPERATURE: 98.1 F | OXYGEN SATURATION: 98 % | DIASTOLIC BLOOD PRESSURE: 109 MMHG | BODY MASS INDEX: 21.83 KG/M2 | RESPIRATION RATE: 16 BRPM

## 2024-03-26 DIAGNOSIS — Z91.148 NON COMPLIANCE W MEDICATION REGIMEN: ICD-10-CM

## 2024-03-26 DIAGNOSIS — L02.415 CELLULITIS AND ABSCESS OF RIGHT LEG: Primary | ICD-10-CM

## 2024-03-26 DIAGNOSIS — F20.9 SCHIZOPHRENIA, UNSPECIFIED TYPE (HCC): ICD-10-CM

## 2024-03-26 DIAGNOSIS — L03.115 CELLULITIS AND ABSCESS OF RIGHT LEG: Primary | ICD-10-CM

## 2024-03-26 DIAGNOSIS — R46.89 VERBALLY ABUSIVE BEHAVIOR: ICD-10-CM

## 2024-03-26 PROCEDURE — 99283 EMERGENCY DEPT VISIT LOW MDM: CPT

## 2024-03-26 RX ORDER — SULFAMETHOXAZOLE AND TRIMETHOPRIM 800; 160 MG/1; MG/1
1 TABLET ORAL 2 TIMES DAILY
Qty: 14 TABLET | Refills: 0 | Status: SHIPPED | OUTPATIENT
Start: 2024-03-26 | End: 2024-04-02

## 2024-03-26 ASSESSMENT — PAIN DESCRIPTION - LOCATION: LOCATION: LEG

## 2024-03-26 ASSESSMENT — PAIN SCALES - GENERAL: PAINLEVEL_OUTOF10: 8

## 2024-03-26 ASSESSMENT — PAIN - FUNCTIONAL ASSESSMENT: PAIN_FUNCTIONAL_ASSESSMENT: 0-10

## 2024-03-26 ASSESSMENT — PAIN DESCRIPTION - ORIENTATION: ORIENTATION: RIGHT

## 2024-03-26 NOTE — DISCHARGE INSTRUCTIONS
You have a skin infection of your right leg that needs blood tests and antibiotics.  I will prescribe the antibiotics.

## 2024-03-26 NOTE — ED PROVIDER NOTES
this note:    No orders to display         ED BEDSIDE ULTRASOUND:   Performed by ED Physician - none    LABS:  Labs Reviewed - No data to display    All other labs were within normal range or not returned as of this dictation.    EMERGENCY DEPARTMENT COURSE and DIFFERENTIAL DIAGNOSIS/MDM:   Vitals:    Vitals:    03/26/24 1200   BP: (!) 156/109   Pulse: 94   Resp: 16   Temp: 98.1 °F (36.7 °C)   TempSrc: Oral   SpO2: 98%   Weight: 59.4 kg (131 lb)   Height: 1.651 m (5' 5\")           Medical Decision Making  Primary concern for developing abscess for which the patient was argumentative and generally disagreeable and irrational.  Patient was medically stable for discharge.  He was prescribed Bactrim and recommended probiotics.  Left without his discharge papers.  Review of his medical records revealed that he is generally homeless and travels from city to city to include [place] to [place]    Problems Addressed:    Differential Diagnosis:  Ruled In: Abscess and cellulitis right leg  Ruled Out:    Data Complexity:   QHP Sources: Patient  Summarization of Records Reviewed: I have reviewed the patient's most up-to-date medication list, allergies, social history  Results of Studies and Effect on Management:  Independent Interpretation: Vital signs with asymptomatic hypertension    Risk Element/Considerations:   Admission or Observation: N  Follow Up Care Discussed:   Studies:   Meds Given or Considered: PO antibiotics prescribed  Reassessment:    Social Determinants Affecting Care:           Risk  Prescription drug management.      FINAL IMPRESSION      1. Cellulitis and abscess of right leg    2. Non compliance w medication regimen    3. Verbally abusive behavior    4. Schizophrenia, unspecified type (HCC)          DISPOSITION/PLAN   DISPOSITION Decision To Discharge 03/26/2024 12:16:45 PM      PATIENT REFERRED TO:  No follow-up provider specified.    DISCHARGE

## 2024-03-26 NOTE — ED TRIAGE NOTES
Was seen in the ER Thursday (6 days ago) prescribed oral antibiotics for an infection on his right leg, has NOT filled his Rx or started on any outpatient treatments. Is here today because his wound is getting worse

## 2024-03-26 NOTE — ED NOTES
BNP is only 226  Given IVF. Appears euvolemic. Will DC now.   TTE normal EF. Mild AS. Mod MR.    Called for treatment, no answer, not in the lobby

## 2024-04-19 ENCOUNTER — HOSPITAL ENCOUNTER (EMERGENCY)
Age: 44
Discharge: HOME | End: 2024-04-19
Attending: EMERGENCY MEDICINE
Payer: COMMERCIAL

## 2024-04-19 VITALS
OXYGEN SATURATION: 99 % | DIASTOLIC BLOOD PRESSURE: 96 MMHG | SYSTOLIC BLOOD PRESSURE: 132 MMHG | HEIGHT: 65 IN | WEIGHT: 129.7 LBS | HEART RATE: 99 BPM | RESPIRATION RATE: 16 BRPM | TEMPERATURE: 98.3 F | BODY MASS INDEX: 21.61 KG/M2

## 2024-04-19 DIAGNOSIS — G89.29 CHRONIC THORACIC BACK PAIN, UNSPECIFIED BACK PAIN LATERALITY: Primary | ICD-10-CM

## 2024-04-19 DIAGNOSIS — M54.6 CHRONIC THORACIC BACK PAIN, UNSPECIFIED BACK PAIN LATERALITY: Primary | ICD-10-CM

## 2024-04-19 PROCEDURE — 99283 EMERGENCY DEPT VISIT LOW MDM: CPT

## 2024-04-19 RX ORDER — KETOROLAC TROMETHAMINE 10 MG/1
10 TABLET, FILM COATED ORAL EVERY 8 HOURS PRN
Qty: 15 TABLET | Refills: 0 | Status: SHIPPED | OUTPATIENT
Start: 2024-04-19

## 2024-04-19 RX ORDER — CYCLOBENZAPRINE HCL 10 MG
10 TABLET ORAL 3 TIMES DAILY PRN
Qty: 21 TABLET | Refills: 0 | Status: SHIPPED | OUTPATIENT
Start: 2024-04-19 | End: 2024-04-29

## 2024-04-19 ASSESSMENT — PAIN DESCRIPTION - DESCRIPTORS: DESCRIPTORS: SPASM

## 2024-04-19 ASSESSMENT — PAIN DESCRIPTION - LOCATION: LOCATION: BACK

## 2024-04-19 ASSESSMENT — PAIN DESCRIPTION - ORIENTATION: ORIENTATION: UPPER

## 2024-04-19 ASSESSMENT — PAIN - FUNCTIONAL ASSESSMENT: PAIN_FUNCTIONAL_ASSESSMENT: 0-10

## 2024-04-19 ASSESSMENT — PAIN SCALES - GENERAL: PAINLEVEL_OUTOF10: 7

## 2024-04-19 NOTE — ED TRIAGE NOTES
Patient presents for upper back pain that began two days ago.  He denies fall or injury.  He states recent increased walking activity.  Denies heavy lifting.  Patient states upper back pain present intermittently for period of approximately 10 years.

## 2024-04-22 NOTE — ED PROVIDER NOTES
[facility] EMERGENCY DEPT  EMERGENCY DEPARTMENT ENCOUNTER       Pt Name: Cata Mckeon  MRN: [id number]  Birthdate 1980  Date of evaluation: 4/19/2024  Provider: Michelle Ross MD   PCP: None, None  Note Started: 4:20 PM 4/22/24     CHIEF COMPLAINT       Chief Complaint   Patient presents with    Back Pain        HISTORY OF PRESENT ILLNESS: 1 or more elements      History From: Patient  History limited by: Nothing     Cata Mckeon is a 43 y.o. male who presents to the ED complaining of neck and upper back pain over the last 2 days.  Patient with long history of same pain since approximately 10 years ago.  He also describes a recent increase in walking activities.  He has no headache, fever, cough or chest pain.     Nursing Notes were all reviewed and agreed with or any disagreements were addressed in the HPI.     REVIEW OF SYSTEMS      Review of Systems     Positives and Pertinent negatives as per HPI.    PAST HISTORY     Past Medical History:  Past Medical History:   Diagnosis Date    Back pain     upper    Hypertension     Psychiatric disorder     bipolar        Past Surgical History:  History reviewed. No pertinent surgical history.    Family History:  History reviewed. No pertinent family history.    Social History:  Social History     Tobacco Use    Smoking status: Some Days     Current packs/day: 3.00     Types: Cigarettes    Smokeless tobacco: Never   Vaping Use    Vaping Use: Never used   Substance Use Topics    Alcohol use: Not Currently    Drug use: Not Currently       Allergies:  Allergies   Allergen Reactions    Haloperidol Hives, Itching and Other (See Comments)     Other reaction(s): Unknown      Seroquel [Quetiapine] Other (See Comments)     Unknown         CURRENT MEDICATIONS      Discharge Medication List as of 4/19/2024  1:11 PM          SCREENINGS               No data recorded         PHYSICAL EXAM      Vitals:    04/19/24 1253   BP: (!) 132/96   Pulse: 99   Resp: 16   Temp: 98.3 °F (36.8

## 2024-04-24 ENCOUNTER — HOSPITAL ENCOUNTER (EMERGENCY)
Age: 44
Discharge: LEFT BEFORE DISCHARGE | End: 2024-04-24

## 2024-04-24 VITALS
WEIGHT: 130 LBS | BODY MASS INDEX: 21.66 KG/M2 | SYSTOLIC BLOOD PRESSURE: 162 MMHG | DIASTOLIC BLOOD PRESSURE: 92 MMHG | HEART RATE: 69 BPM | TEMPERATURE: 98.1 F | HEIGHT: 65 IN | OXYGEN SATURATION: 98 % | RESPIRATION RATE: 14 BRPM

## 2024-04-24 NOTE — ED NOTES
He requests information on the mobile clinics that come to this area. 2 handouts given with mobile clinic schedules.

## 2024-04-24 NOTE — ED TRIAGE NOTES
Reports that he has a rash on his chest from using Vicks Vapo Rub. Also states that he was prescribed medication for his last visit but didn't pick it up because \" I aint trying to pay for these medicines\".

## 2024-05-24 ENCOUNTER — HOSPITAL ENCOUNTER (EMERGENCY)
Age: 44
Discharge: LEFT BEFORE DISCHARGE | End: 2024-05-24

## 2024-05-30 ENCOUNTER — HOSPITAL ENCOUNTER (EMERGENCY)
Facility: HOSPITAL | Age: 44
Discharge: LEFT BEFORE DISCHARGE | End: 2024-05-30

## 2024-05-30 VITALS
HEIGHT: 65 IN | TEMPERATURE: 98.8 F | OXYGEN SATURATION: 100 % | RESPIRATION RATE: 18 BRPM | BODY MASS INDEX: 21.63 KG/M2 | HEART RATE: 83 BPM

## 2024-05-30 ASSESSMENT — PAIN - FUNCTIONAL ASSESSMENT: PAIN_FUNCTIONAL_ASSESSMENT: 0-10

## 2024-05-30 ASSESSMENT — PAIN SCALES - GENERAL: PAINLEVEL_OUTOF10: 8

## 2024-05-30 NOTE — ED TRIAGE NOTES
Pt reports neck and back pain without any obvious injury. Pt states it began hurting. Pt presents with word salad during triage. Pt rates pain 8/10. Pt became agitated with vital collection. Incomplete data for BP. Pt unwilling have BP taken again.

## 2024-06-03 ENCOUNTER — HOSPITAL ENCOUNTER (EMERGENCY)
Age: 44
Discharge: HOME | End: 2024-06-03
Attending: EMERGENCY MEDICINE
Payer: COMMERCIAL

## 2024-06-03 VITALS
HEIGHT: 65 IN | HEART RATE: 79 BPM | BODY MASS INDEX: 21.66 KG/M2 | RESPIRATION RATE: 18 BRPM | TEMPERATURE: 98.9 F | OXYGEN SATURATION: 100 % | SYSTOLIC BLOOD PRESSURE: 122 MMHG | WEIGHT: 130 LBS | DIASTOLIC BLOOD PRESSURE: 99 MMHG

## 2024-06-03 DIAGNOSIS — L25.9 CONTACT DERMATITIS, UNSPECIFIED CONTACT DERMATITIS TYPE, UNSPECIFIED TRIGGER: Primary | ICD-10-CM

## 2024-06-03 PROCEDURE — 6370000000 HC RX 637 (ALT 250 FOR IP): Performed by: EMERGENCY MEDICINE

## 2024-06-03 PROCEDURE — 99283 EMERGENCY DEPT VISIT LOW MDM: CPT

## 2024-06-03 RX ORDER — PREDNISONE 20 MG/1
60 TABLET ORAL ONCE
Status: COMPLETED | OUTPATIENT
Start: 2024-06-03 | End: 2024-06-03

## 2024-06-03 RX ORDER — LORATADINE 10 MG/1
10 TABLET ORAL DAILY
Qty: 7 TABLET | Refills: 0 | Status: SHIPPED | OUTPATIENT
Start: 2024-06-03 | End: 2024-06-05

## 2024-06-03 RX ORDER — PREDNISONE 50 MG/1
50 TABLET ORAL DAILY
Qty: 4 TABLET | Refills: 0 | Status: SHIPPED | OUTPATIENT
Start: 2024-06-03 | End: 2024-06-07

## 2024-06-03 RX ORDER — CETIRIZINE HYDROCHLORIDE 10 MG/1
10 TABLET ORAL DAILY
Status: DISCONTINUED | OUTPATIENT
Start: 2024-06-03 | End: 2024-06-03 | Stop reason: HOSPADM

## 2024-06-03 RX ADMIN — PREDNISONE 60 MG: 20 TABLET ORAL at 16:38

## 2024-06-03 RX ADMIN — CETIRIZINE HYDROCHLORIDE 10 MG: 10 TABLET, FILM COATED ORAL at 16:40

## 2024-06-03 ASSESSMENT — PAIN SCALES - GENERAL: PAINLEVEL_OUTOF10: 0

## 2024-06-03 ASSESSMENT — PAIN - FUNCTIONAL ASSESSMENT: PAIN_FUNCTIONAL_ASSESSMENT: 0-10

## 2024-06-03 NOTE — ED PROVIDER NOTES
[facility] EMERGENCY DEPT  eMERGENCY dEPARTMENT eNCOUnter        Pt Name: Cata Mckeon  MRN: [id number]  Birthdate 1980  Date of evaluation: 6/3/2024  Provider: Kelvin Mendez MD  PCP: None, None  Note Started: 6:37 PM EDT 6/3/2024          CHIEF COMPLAINT       Chief Complaint   Patient presents with    Pruritis       HISTORY OF PRESENT ILLNESS        Patient states that he was exposed to grass when he was dumping trash.  Complains of a diffuse pruritic rash.  No swelling of lips or mouth, difficulty swallowing.  No chest pain or shortness of breath.  Nothing tried prior to presentation    Nursing Notes were all reviewed and agreed with or any disagreements were addressed  in the HPI.    REVIEW OF SYSTEMS         The following 10 systems are reviewed and negative except as noted in the HPI: Constitutional, Eyes, ENT, cardiovascular, pulmonary, GI, , neuro, skin, and musculoskeletal       PASTMEDICAL HISTORY     Past Medical History:   Diagnosis Date    Back pain     upper    Hypertension     Psychiatric disorder     bipolar          SURGICAL HISTORY     History reviewed. No pertinent surgical history.      CURRENT MEDICATIONS       Discharge Medication List as of 6/3/2024  4:36 PM        CONTINUE these medications which have NOT CHANGED    Details   ketorolac (TORADOL) 10 MG tablet Take 1 tablet by mouth every 8 hours as needed for Pain, Disp-15 tablet, R-0Normal             ALLERGIES     Haloperidol and Seroquel [quetiapine]    FAMILY HISTORY     History reviewed. No pertinent family history.       SOCIAL HISTORY       Social History     Socioeconomic History    Marital status: Single     Spouse name: None    Number of children: None    Years of education: None    Highest education level: None   Tobacco Use    Smoking status: Some Days     Current packs/day: 3.00     Types: Cigarettes, Cigars    Smokeless tobacco: Never   Vaping Use    Vaping Use: Never used   Substance and Sexual Activity    Alcohol

## 2024-06-03 NOTE — DISCHARGE INSTRUCTIONS
Take the prednisone as directed, your next dose tomorrow morning.  Take the Claritin daily for itching.  You may also use over-the-counter Benadryl at night if desired.  Cool compresses can be helpful.  I provided information for primary care follow-up if not improving in the coming week.  Return as needed

## 2024-06-03 NOTE — ED TRIAGE NOTES
Between bites of doritos, and sips of monster energy drink, pt states \" I got some real bad itching, I got into some grass when I was dumping trash, I need some calamine lotions.... can I get a bologna sandwich\"

## 2024-06-05 ENCOUNTER — HOSPITAL ENCOUNTER (EMERGENCY)
Age: 44
Discharge: HOME | End: 2024-06-05
Attending: EMERGENCY MEDICINE
Payer: COMMERCIAL

## 2024-06-05 VITALS
RESPIRATION RATE: 14 BRPM | HEIGHT: 65 IN | OXYGEN SATURATION: 97 % | TEMPERATURE: 98.9 F | BODY MASS INDEX: 21.67 KG/M2 | DIASTOLIC BLOOD PRESSURE: 95 MMHG | HEART RATE: 104 BPM | WEIGHT: 130.07 LBS | SYSTOLIC BLOOD PRESSURE: 146 MMHG

## 2024-06-05 DIAGNOSIS — L25.9 CONTACT DERMATITIS, UNSPECIFIED CONTACT DERMATITIS TYPE, UNSPECIFIED TRIGGER: Primary | ICD-10-CM

## 2024-06-05 PROCEDURE — 99283 EMERGENCY DEPT VISIT LOW MDM: CPT

## 2024-06-05 RX ORDER — LORATADINE 10 MG/1
10 TABLET ORAL DAILY
Qty: 7 TABLET | Refills: 0 | Status: SHIPPED | OUTPATIENT
Start: 2024-06-05 | End: 2024-06-12

## 2024-06-05 ASSESSMENT — PAIN - FUNCTIONAL ASSESSMENT: PAIN_FUNCTIONAL_ASSESSMENT: NONE - DENIES PAIN

## 2024-06-05 NOTE — ED PROVIDER NOTES
were made to edit the dictations but occasionally words aremis-transcribed.)    Kelvin Mendez MD (electronically signed)          Kelvin Mendez MD  06/05/24 2198

## 2024-06-05 NOTE — ED TRIAGE NOTES
Pt to ER c/o itching to arms and chest. States that he was seen in the ER for the same thing 2 days ago, was given a RX for meds but hasn't gotten them.

## 2024-06-05 NOTE — ED NOTES
Pt aox4 at discharge. NAD noted. Pt provided with discharge paperwork and verbalized understanding.

## 2024-06-05 NOTE — DISCHARGE INSTRUCTIONS
You have been provided with a Good Rx card which will help reduce costs for the prednisone and the Claritin.  I have resent the prescription for Claritin.  If you get to the pharmacy and there is any problem filling the prescriptions, please asked the pharmacist to call the emergency department to speak with me.  Take the medications as directed.  I strongly encourage you to establish primary care for routine health care needs and follow-up of this condition.

## 2024-06-12 ENCOUNTER — HOSPITAL ENCOUNTER (EMERGENCY)
Facility: HOSPITAL | Age: 44
Discharge: HOME | End: 2024-06-12
Attending: STUDENT IN AN ORGANIZED HEALTH CARE EDUCATION/TRAINING PROGRAM
Payer: COMMERCIAL

## 2024-06-12 VITALS
RESPIRATION RATE: 18 BRPM | HEART RATE: 95 BPM | TEMPERATURE: 98.2 F | SYSTOLIC BLOOD PRESSURE: 145 MMHG | DIASTOLIC BLOOD PRESSURE: 95 MMHG | BODY MASS INDEX: 21.33 KG/M2 | WEIGHT: 128 LBS | OXYGEN SATURATION: 100 % | HEIGHT: 65 IN

## 2024-06-12 DIAGNOSIS — L25.9 CONTACT DERMATITIS, UNSPECIFIED CONTACT DERMATITIS TYPE, UNSPECIFIED TRIGGER: ICD-10-CM

## 2024-06-12 DIAGNOSIS — K21.9 GASTROESOPHAGEAL REFLUX DISEASE WITHOUT ESOPHAGITIS: Primary | ICD-10-CM

## 2024-06-12 PROCEDURE — 99283 EMERGENCY DEPT VISIT LOW MDM: CPT

## 2024-06-12 PROCEDURE — 6370000000 HC RX 637 (ALT 250 FOR IP): Performed by: STUDENT IN AN ORGANIZED HEALTH CARE EDUCATION/TRAINING PROGRAM

## 2024-06-12 RX ORDER — ACETAMINOPHEN 500 MG
1000 TABLET ORAL
Status: COMPLETED | OUTPATIENT
Start: 2024-06-12 | End: 2024-06-12

## 2024-06-12 RX ORDER — CASTOR OIL AND BALSAM, PERU 788; 87 MG/G; MG/G
OINTMENT TOPICAL 2 TIMES DAILY
Qty: 1 EACH | Refills: 0 | Status: SHIPPED | OUTPATIENT
Start: 2024-06-12

## 2024-06-12 RX ORDER — FAMOTIDINE 20 MG/1
20 TABLET, FILM COATED ORAL 2 TIMES DAILY
Qty: 60 TABLET | Refills: 0 | Status: SHIPPED | OUTPATIENT
Start: 2024-06-12

## 2024-06-12 RX ORDER — FAMOTIDINE 20 MG/1
20 TABLET, FILM COATED ORAL
Status: COMPLETED | OUTPATIENT
Start: 2024-06-12 | End: 2024-06-12

## 2024-06-12 RX ADMIN — FAMOTIDINE 20 MG: 20 TABLET, FILM COATED ORAL at 23:00

## 2024-06-12 RX ADMIN — ACETAMINOPHEN 1000 MG: 500 TABLET ORAL at 22:59

## 2024-06-12 ASSESSMENT — PAIN - FUNCTIONAL ASSESSMENT
PAIN_FUNCTIONAL_ASSESSMENT: ACTIVITIES ARE NOT PREVENTED
PAIN_FUNCTIONAL_ASSESSMENT: 0-10
PAIN_FUNCTIONAL_ASSESSMENT: 0-10

## 2024-06-12 ASSESSMENT — PAIN SCALES - GENERAL
PAINLEVEL_OUTOF10: 8
PAINLEVEL_OUTOF10: 8
PAINLEVEL_OUTOF10: 0

## 2024-06-12 ASSESSMENT — PAIN DESCRIPTION - ORIENTATION: ORIENTATION: MID

## 2024-06-12 ASSESSMENT — PAIN DESCRIPTION - LOCATION
LOCATION: ABDOMEN
LOCATION: ABDOMEN

## 2024-06-12 ASSESSMENT — PAIN DESCRIPTION - DESCRIPTORS: DESCRIPTORS: ACHING

## 2024-06-13 NOTE — ED NOTES
Chaperoned physician during assessment of patient.  During physician assessment, patient became agitated and began demanding water.  Dr. Mckee explained to the patient that it was important to perform an assessment first, in order to determine if patient can have water.  Patient was given water by the ED physician.  As soon as the physician left the room he began demanding soda, snacks, sandwiches, meal boxes, etc.  Patient was given a soda.

## 2024-06-13 NOTE — ED TRIAGE NOTES
Patient presents for complaint of RUQ abdominal pain.  States he is currently homeless and has been walking for a good portion of the day.

## 2024-06-14 ENCOUNTER — HOSPITAL ENCOUNTER (EMERGENCY)
Facility: HOSPITAL | Age: 44
Discharge: HOME | End: 2024-06-14
Attending: EMERGENCY MEDICINE
Payer: COMMERCIAL

## 2024-06-14 VITALS
OXYGEN SATURATION: 99 % | RESPIRATION RATE: 18 BRPM | WEIGHT: 126.7 LBS | BODY MASS INDEX: 21.63 KG/M2 | HEIGHT: 64 IN | SYSTOLIC BLOOD PRESSURE: 168 MMHG | TEMPERATURE: 98.1 F | HEART RATE: 99 BPM | DIASTOLIC BLOOD PRESSURE: 98 MMHG

## 2024-06-14 DIAGNOSIS — R21 RASH AND OTHER NONSPECIFIC SKIN ERUPTION: Primary | ICD-10-CM

## 2024-06-14 PROCEDURE — 99283 EMERGENCY DEPT VISIT LOW MDM: CPT

## 2024-06-14 PROCEDURE — 6370000000 HC RX 637 (ALT 250 FOR IP): Performed by: EMERGENCY MEDICINE

## 2024-06-14 RX ORDER — HYDROXYZINE HYDROCHLORIDE 25 MG/1
25 TABLET, FILM COATED ORAL EVERY 8 HOURS PRN
Qty: 10 TABLET | Refills: 0 | Status: SHIPPED | OUTPATIENT
Start: 2024-06-14 | End: 2024-06-18

## 2024-06-14 RX ORDER — HYDROXYZINE 50 MG/1
50 TABLET, FILM COATED ORAL
Status: COMPLETED | OUTPATIENT
Start: 2024-06-14 | End: 2024-06-14

## 2024-06-14 RX ORDER — PREDNISONE 20 MG/1
20 TABLET ORAL DAILY
Qty: 4 TABLET | Refills: 0 | Status: SHIPPED | OUTPATIENT
Start: 2024-06-14 | End: 2024-06-18

## 2024-06-14 RX ADMIN — HYDROXYZINE HYDROCHLORIDE 50 MG: 50 TABLET, FILM COATED ORAL at 21:56

## 2024-06-14 ASSESSMENT — ENCOUNTER SYMPTOMS
ALLERGIC/IMMUNOLOGIC NEGATIVE: 1
EYES NEGATIVE: 1
RESPIRATORY NEGATIVE: 1
GASTROINTESTINAL NEGATIVE: 1
ROS SKIN COMMENTS: GENERALIZED

## 2024-06-14 ASSESSMENT — PAIN - FUNCTIONAL ASSESSMENT: PAIN_FUNCTIONAL_ASSESSMENT: NONE - DENIES PAIN

## 2024-06-15 NOTE — ED NOTES
Pt refusing Solu-Medrol injection at this time.  Pt states he wants the doctor to give him the medication.  Informed patient that Mdwould not be able to give medication.  Pt given d/c instructions along w/ resources for nearby shelters, primary care providers, and cab company phone numbers. Pt ambulated to [facility].

## 2024-06-15 NOTE — ED TRIAGE NOTES
\"Eczema is bad all over\". Pt arrives with a generalized rash and an unknown cream all over body. Will not elaborate further

## 2024-06-15 NOTE — DISCHARGE INSTRUCTIONS
Avoid irritants.  Aveeno bath recommended once a day.  Get prescription filled for Atarax and prednisone.  Follow-up with primary care doctor 24 to 48 hours.  If symptoms get worse return to ED immediately.

## 2024-06-15 NOTE — ED PROVIDER NOTES
[facility] EMERGENCY DEPT  EMERGENCY DEPARTMENT ENCOUNTER      Pt Name: Cata Mckeon  MRN: [id number]  Birthdate 1980  Date of evaluation: 6/14/2024  Provider: Qing Irene MD  8:49 PM    CHIEF COMPLAINT       Chief Complaint   Patient presents with    Skin Problem         HISTORY OF PRESENT ILLNESS    Cata Mckeon is a 43 y.o. male who presents to the emergency department skin problem.     HPI: This is a homeless man who presents with generalized rash on arms and face X 2 weeks.  Patient has been seen by a physician in prescribed medication unknown.  Patient has a history of eczema.  States that he got an some grass that exacerbated the area.  In ED he is covered with white coating all over his arms and neck.  Patient states basically he would like something for the rash but would like to be transferred to the homeless shelter in [place]..  Patient denies depression suicidal ideations.  No fevers or chills    Nursing Notes were reviewed.    REVIEW OF SYSTEMS       Review of Systems   Constitutional: Negative.  Negative for chills and fever.   HENT: Negative.     Eyes: Negative.    Respiratory: Negative.     Cardiovascular: Negative.    Gastrointestinal: Negative.    Endocrine: Negative.    Genitourinary: Negative.    Musculoskeletal: Negative.    Skin:  Positive for rash.        generalized   Allergic/Immunologic: Negative.    Neurological: Negative.    Hematological: Negative.    Psychiatric/Behavioral: Negative.     All other systems reviewed and are negative.      Except as noted above the remainder of the review of systems was reviewed and negative.       PAST MEDICAL HISTORY     Past Medical History:   Diagnosis Date    Back pain     upper    Hypertension     Psychiatric disorder     bipolar          SURGICAL HISTORY     No past surgical history on file.      CURRENT MEDICATIONS       Previous Medications    BALSUM PERU-CASTOR OIL (VENELEX) OINT OINTMENT    Apply topically 2 times daily    FAMOTIDINE

## 2024-06-20 ENCOUNTER — HOSPITAL ENCOUNTER (EMERGENCY)
Facility: HOSPITAL | Age: 44
Discharge: HOME | End: 2024-06-20
Attending: STUDENT IN AN ORGANIZED HEALTH CARE EDUCATION/TRAINING PROGRAM
Payer: COMMERCIAL

## 2024-06-20 VITALS
BODY MASS INDEX: 19.99 KG/M2 | HEIGHT: 65 IN | TEMPERATURE: 98.4 F | RESPIRATION RATE: 18 BRPM | SYSTOLIC BLOOD PRESSURE: 133 MMHG | OXYGEN SATURATION: 98 % | WEIGHT: 120 LBS | HEART RATE: 74 BPM | DIASTOLIC BLOOD PRESSURE: 86 MMHG

## 2024-06-20 DIAGNOSIS — L30.9 ECZEMA, UNSPECIFIED TYPE: Primary | ICD-10-CM

## 2024-06-20 PROCEDURE — 6370000000 HC RX 637 (ALT 250 FOR IP): Performed by: STUDENT IN AN ORGANIZED HEALTH CARE EDUCATION/TRAINING PROGRAM

## 2024-06-20 PROCEDURE — 99283 EMERGENCY DEPT VISIT LOW MDM: CPT

## 2024-06-20 RX ORDER — IBUPROFEN 600 MG/1
600 TABLET ORAL
Status: COMPLETED | OUTPATIENT
Start: 2024-06-20 | End: 2024-06-20

## 2024-06-20 RX ORDER — DIAPER,BRIEF,INFANT-TODD,DISP
EACH MISCELLANEOUS
Qty: 28 G | Refills: 0 | Status: SHIPPED | OUTPATIENT
Start: 2024-06-20

## 2024-06-20 RX ADMIN — IBUPROFEN 600 MG: 600 TABLET, FILM COATED ORAL at 03:22

## 2024-06-20 ASSESSMENT — PAIN - FUNCTIONAL ASSESSMENT: PAIN_FUNCTIONAL_ASSESSMENT: NONE - DENIES PAIN

## 2024-06-20 NOTE — ED PROVIDER NOTES
[facility]  Surgery Department  Operative Note    SUMMARY     Date of Procedure: 4/23/2019     Procedure: Procedure(s) (LRB):  APPENDECTOMY, LAPAROSCOPIC (N/A)  LYSIS, ADHESIONS, LAPAROSCOPIC (N/A)     Surgeon(s) and Role:     * Greyson Moncada MD - Primary    Assisting Surgeon: None    Pre-Operative Diagnosis: Acute appendicitis with localized peritonitis, without perforation, abscess, or gangrene [K35.30]    Post-Operative Diagnosis: Post-Op Diagnosis Codes:     * Acute appendicitis with localized peritonitis, without perforation, abscess, or gangrene [K35.30]    Anesthesia: General    Technical Procedures Used:   1.  Laparoscopic appendectomy  2.  Laparoscopic lysis of adhesions  3.  Laparoscopic transversus abdominis plane block    Indications for Procedure:  25-year-old female who had acute on chronic abdominal pain for 1 day with associated nausea and vomiting the CT scan concerning for acute appendicitis    Findings of the Procedure:  Relatively normal appearing appendix with some adhesive tissue between the omentum and the anterior abdominal wall, incidentally found left ovarian cyst    Description of the Procedure:  Patient was brought to the operating placed supine on the table. General endotracheal anesthesia was then induced. Renee catheter was then sterilely placed. The abdomen was then prepped and draped in usual sterile fashion.  Preoperative surgical time-out was then performed confirming the correct patient, procedure and preop medications given.  A supraumbilical curvilinear incision was made with a knife and dissection was carried down to the umbilical stalk, which was grasped with a Kocher and retracted anteriorly.  The fascia was then grasped and incised sharply with Metzenbaum scissors. The sigmoid was carried down to the peritoneum which was grasped between Sisi clamps and incised with Metzenbaum scissors sharply and abdominal cavity was entered without injury to  underlying structures. A 12 mm balloon trocar was then placed through this defect and the abdomen was insufflated to a pressure of 15 mm of mercury.  Laparoscopic camera was then introduced through the trocar and the abdomen was checked for any signs of injury upon entry, which there was none.  Immediately upon entry into the abdominal cavity dense adhesions of the omentum were noted to the lower abdominal wall including to her previous Pfannenstiel incision. An additional left lower quadrant 5 mm trocar was placed under direct laparoscopic visualization without any injury upon entry.  The Ligasure was then used to take down the adhesions between the omentum and the anterior abdominal wall. Once this was accomplished and the omentum was freed this was retracted superiorly out of the field. Additional 5 mm trocar was then placed in the lower midline under direct laparoscopic visualization without any injury upon entry.  A laparoscopic transversus abdominis plane block was then performed using a mixture of 30 cc of 0.25% bupivacaine plain with 20 cc of Exparel.  12.5 cc was injected into each quadrant of the abdominal wall into the transversus abdominis plane under direct laparoscopic visualization for a total of 50 cc given.  Attention was then turned to the right lower quadrant and the appendix was easily identified inferior to the cecum.  The appendix did not appear grossly inflamed or significantly dilated.  The small bowel and terminal ileum were then checked and found to be normal in appearance as well. Given the findings of appendicitis on CT scan and her history of right lower quadrant pain and presentation, it was felt best to resect the appendix this time to rule this out as the cause of her pain. The Ligasure was used to create a mesenteric window at the base of the appendix and the mesoappendix was then taken with Ligasure.  The mesoappendix stump was then checked and found to be hemostatic. At this point  a laparoscopic echelon 45 blue load was then inserted into the abdominal cavity and the appendix was stapled at its base where it enters the cecum.  Once this was completed the staple line was evaluated and found to be hemostatic. The appendix was then withdrawn from the abdominal cavity through the 12 mm port. Attention was then turned to the pelvis where small amount of yellow physiologic fluid was found in the pelvis.  The right ovary and fallopian tube were found to be normal.  The left ovary however was inside the pelvic cavity and once retracted out was found to have a small hemorrhagic cyst associated with it. I did ask for an intraoperative consult from OBGYN which Dr. Hurt came into the operating room and evaluated the cyst and found that this required no operative intervention.  Therefore, the appendiceal stump and mesoappendix were then checked and again found to be hemostatic and intact. The abdomen was desufflated and the trocars were removed under direct laparoscopic visualization. The fascia of the 12 mm trocar site was then closed with a figure-of-eight 0 Vicryl suture. The subcutaneous tissues were then copiously irrigated. The skin overlying the trocar sites was then closed with a 4-0 Monocryl suture. Skin glue was then applied. All needle, instrument and sponge counts were correct at the end of the case. The Renee catheter was then removed.  The patient was then woken from general endotracheal anesthesia and transferred to the postanesthesia care in stable condition. She tolerated procedure well.    Significant Surgical Tasks Conducted by the Assistant(s), if Applicable: N/A    Complications: No    Estimated Blood Loss (EBL): 5mL           Implants: * No implants in log *    Specimens:   Specimen (12h ago, onward)    Start     Ordered    04/23/19 1911  Specimen to Pathology - Surgery  Once     Comments:  Pre-op Diagnosis: Acute appendicitis with localized peritonitis, without perforation,  abscess, or gangrene [K35.30]Procedure(s):APPENDECTOMY, LAPAROSCOPIC Specimens: Appendix     Start Status     04/23/19 1704 Collected (04/23/19 1704) Order ID: [id number]       04/23/19 1703                  Condition: Good    Disposition: PACU - hemodynamically stable.    Attestation: I performed the procedure.   reviewed    Please note that this dictation was completed with [employer], the computer voice recognition software.  Quite often unanticipated grammatical, syntax, homophones, and other interpretive errors are inadvertently transcribed by the computer software.  Please disregard these errors.  Please excuse any errors that have escaped final proofreading.       Taye Lopez MD  06/20/24 2203

## 2024-06-20 NOTE — DISCHARGE INSTRUCTIONS
Thank you!    Thank you for allowing me to care for you in the emergency department.  I sincerely hope that you are satisfied with your visit today.  It is my goal to provide you with excellent care.    Below you will find a list of your labs and imaging from your visit today if applicable. Should you have any questions regarding these results please do not hesitate to call the emergency department. Please review [employer] for a more detailed result list since the below list may not be comprehensive. Instructions on how to sign up to [employer] should be provided in this packet.    Labs -  No results found for this or any previous visit (from the past 12 hour(s)).    Radiologic Studies -   No orders to display          If you feel that you have not received excellent quality care or timely care, please ask to speak to the nurse manager. Please choose us in the future for your continued health care needs.   ------------------------------------------------------------------------------------------------------------  The exam and treatment you received in the Emergency Department were for an urgent problem and are not intended as complete care. It is important that you follow-up with a doctor, nurse practitioner, or physician assistant to:  (1) confirm your diagnosis,  (2) re-evaluation of changes in your illness and treatment, and  (3) for ongoing care.  If your symptoms become worse or you do not improve as expected and you are unable to reach your usual health care provider, you should return to the Emergency Department. We are available 24 hours a day.     Please take your discharge instructions with you when you go to your follow-up appointment.     If a prescription has been provided, please have it filled as soon as possible to prevent a delay in treatment. Read the entire medication instruction sheet provided to you by the pharmacy. If you have any questions or reservations about taking the medication due to side

## 2024-08-30 ENCOUNTER — HOSPITAL ENCOUNTER (EMERGENCY)
Age: 44
Discharge: HOME | End: 2024-08-30
Attending: FAMILY MEDICINE
Payer: COMMERCIAL

## 2024-08-30 VITALS
DIASTOLIC BLOOD PRESSURE: 98 MMHG | TEMPERATURE: 98.2 F | HEART RATE: 78 BPM | BODY MASS INDEX: 19.99 KG/M2 | SYSTOLIC BLOOD PRESSURE: 142 MMHG | WEIGHT: 120 LBS | HEIGHT: 65 IN | OXYGEN SATURATION: 99 % | RESPIRATION RATE: 16 BRPM

## 2024-08-30 DIAGNOSIS — K14.8 TONGUE LESION: Primary | ICD-10-CM

## 2024-08-30 PROCEDURE — 99283 EMERGENCY DEPT VISIT LOW MDM: CPT

## 2024-08-30 RX ORDER — CHLORHEXIDINE GLUCONATE ORAL RINSE 1.2 MG/ML
15 SOLUTION DENTAL 2 TIMES DAILY
Qty: 420 ML | Refills: 0 | Status: SHIPPED | OUTPATIENT
Start: 2024-08-30 | End: 2024-09-13

## 2024-08-30 ASSESSMENT — PAIN - FUNCTIONAL ASSESSMENT
PAIN_FUNCTIONAL_ASSESSMENT: 0-10
PAIN_FUNCTIONAL_ASSESSMENT: 0-10

## 2024-08-30 ASSESSMENT — PAIN SCALES - GENERAL
PAINLEVEL_OUTOF10: 0
PAINLEVEL_OUTOF10: 0

## 2024-08-30 NOTE — ED TRIAGE NOTES
Patient ambulatory to room with steady gait. Patient reports he has had these 2 places on his tongue for 2 weeks and he wants to get them checked out. Reports his mom has been sick and he has been stressed out over her and not sure if that is what caused them. 2 white patches noted to right side of tongue. Patient denies any pain.

## 2024-08-30 NOTE — ED PROVIDER NOTES
[facility] EMERGENCY DEPT  EMERGENCY DEPARTMENT ENCOUNTER      Pt Name: Cata Mckeon  MRN: [id number]  Birthdate 1980  Date of evaluation: 8/30/2024  Provider: Alexis Correia DO  7:42 PM    CHIEF COMPLAINT       Chief Complaint   Patient presents with    Mouth Lesions         HISTORY OF PRESENT ILLNESS    Cata Mckeon is a 43 y.o. male who presents to the emergency department tongue lesion     Patient presents to the ED for a 2 week history of a white spot on the right side of the tongue. No known inciting event, although he reports increased stress over the last few weeks and believes that is a contributor. Nothing makes the symptoms better or worse. No associated symptoms. He reports tobacco use, no dipping tobacco or ETOH. He just wanted to get checked out to make sure he did not have an infection of his tongue. He denies any trauma to the tongue    The history is provided by the patient and medical records.       Nursing Notes were reviewed.    REVIEW OF SYSTEMS       Review of Systems   Constitutional: Negative.    HENT:  Positive for mouth sores. Negative for drooling, trouble swallowing and voice change.    Respiratory: Negative.     Cardiovascular: Negative.    Gastrointestinal: Negative.    Genitourinary: Negative.    Neurological: Negative.    All other systems reviewed and are negative.      Except as noted above the remainder of the review of systems was reviewed and negative.       PAST MEDICAL HISTORY     Past Medical History:   Diagnosis Date    Back pain     upper    Eczema     Hypertension     Psychiatric disorder     bipolar          SURGICAL HISTORY     History reviewed. No pertinent surgical history.      CURRENT MEDICATIONS       Previous Medications    BALSUM PERU-CASTOR OIL (VENELEX) OINT OINTMENT    Apply topically 2 times daily    FAMOTIDINE (PEPCID) 20 MG TABLET    Take 1 tablet by mouth 2 times daily    HYDROCORTISONE 0.5 % OINTMENT    Apply topically 2 times daily.    KETOROLAC

## 2024-08-30 NOTE — ED NOTES
I have reviewed discharge instructions with the patient.  The patient verbalized understanding. Patient instructed to follow up with ENT and dentist and encouraged to return to ER with any other concerns  or emergent care needed. Patient verbalized understanding.

## 2024-08-31 ASSESSMENT — ENCOUNTER SYMPTOMS
VOICE CHANGE: 0
RESPIRATORY NEGATIVE: 1
TROUBLE SWALLOWING: 0
GASTROINTESTINAL NEGATIVE: 1

## 2024-09-05 ENCOUNTER — HOSPITAL ENCOUNTER (EMERGENCY)
Age: 44
Discharge: HOME | End: 2024-09-05
Attending: EMERGENCY MEDICINE
Payer: COMMERCIAL

## 2024-09-05 VITALS
HEART RATE: 87 BPM | TEMPERATURE: 98.4 F | WEIGHT: 120 LBS | SYSTOLIC BLOOD PRESSURE: 141 MMHG | DIASTOLIC BLOOD PRESSURE: 95 MMHG | RESPIRATION RATE: 18 BRPM | HEIGHT: 65 IN | OXYGEN SATURATION: 100 % | BODY MASS INDEX: 19.99 KG/M2

## 2024-09-05 DIAGNOSIS — L30.9 DERMATITIS: Primary | ICD-10-CM

## 2024-09-05 PROCEDURE — 99283 EMERGENCY DEPT VISIT LOW MDM: CPT

## 2024-09-05 PROCEDURE — 6370000000 HC RX 637 (ALT 250 FOR IP): Performed by: EMERGENCY MEDICINE

## 2024-09-05 RX ORDER — TRIAMCINOLONE ACETONIDE 0.25 MG/G
OINTMENT TOPICAL
Qty: 15 G | Refills: 0 | Status: SHIPPED | OUTPATIENT
Start: 2024-09-05

## 2024-09-05 RX ORDER — DIPHENHYDRAMINE HCL 25 MG
25 TABLET ORAL ONCE
Status: COMPLETED | OUTPATIENT
Start: 2024-09-05 | End: 2024-09-05

## 2024-09-05 RX ADMIN — DIPHENHYDRAMINE HYDROCHLORIDE 25 MG: 25 TABLET ORAL at 20:04

## 2024-09-05 ASSESSMENT — PAIN SCALES - GENERAL: PAINLEVEL_OUTOF10: 0

## 2024-09-05 ASSESSMENT — PAIN - FUNCTIONAL ASSESSMENT: PAIN_FUNCTIONAL_ASSESSMENT: 0-10

## 2024-09-05 NOTE — DISCHARGE INSTRUCTIONS
Return for pain, fever not resolving with motrin or tylenol, shortness of breath, vomiting, decreased fluid intake, weakness, numbness, dizziness, or any change or concerns.    Use benadryl over the counter as directed for itching.

## 2024-09-05 NOTE — ED TRIAGE NOTES
Patient ambulatory to room with steady gait. Patient reports he has dry skin to his chest. Patient States he has had it for the past 24 hours.   Yes - the patient is able to be screened

## 2024-09-05 NOTE — ED PROVIDER NOTES
No data to display                (Please note that portions of this note were completed with a voice recognition program.  Efforts were made to edit the dictations but occasionally words are mis-transcribed.)    Hong Guevara MD (electronically signed)  Attending Emergency Physician          Hong Guevara MD  09/05/24 2014

## 2024-09-06 ENCOUNTER — HOSPITAL ENCOUNTER (EMERGENCY)
Age: 44
Discharge: LEFT BEFORE DISCHARGE | End: 2024-09-06

## 2024-09-06 VITALS — RESPIRATION RATE: 16 BRPM | OXYGEN SATURATION: 100 % | HEART RATE: 93 BPM

## 2024-09-06 NOTE — ED NOTES
I have reviewed discharge instructions with the patient.  The patient verbalized understanding. Patient instructed to follow up with PCP and encouraged to return to ER with any other concerns or emergent care needed. Patient verbalized understanding.

## 2024-12-21 ENCOUNTER — HOSPITAL ENCOUNTER (EMERGENCY)
Age: 44
Discharge: HOME | End: 2024-12-21
Attending: EMERGENCY MEDICINE
Payer: COMMERCIAL

## 2024-12-21 VITALS
HEART RATE: 79 BPM | DIASTOLIC BLOOD PRESSURE: 108 MMHG | WEIGHT: 126 LBS | TEMPERATURE: 97.9 F | BODY MASS INDEX: 20.99 KG/M2 | OXYGEN SATURATION: 97 % | SYSTOLIC BLOOD PRESSURE: 155 MMHG | RESPIRATION RATE: 18 BRPM | HEIGHT: 65 IN

## 2024-12-21 DIAGNOSIS — L30.9 ECZEMA, UNSPECIFIED TYPE: ICD-10-CM

## 2024-12-21 DIAGNOSIS — M54.6 THORACIC BACK PAIN, UNSPECIFIED BACK PAIN LATERALITY, UNSPECIFIED CHRONICITY: Primary | ICD-10-CM

## 2024-12-21 PROCEDURE — 99283 EMERGENCY DEPT VISIT LOW MDM: CPT

## 2024-12-21 PROCEDURE — 6370000000 HC RX 637 (ALT 250 FOR IP): Performed by: EMERGENCY MEDICINE

## 2024-12-21 RX ORDER — IBUPROFEN 400 MG/1
400 TABLET, FILM COATED ORAL EVERY 6 HOURS PRN
Qty: 60 TABLET | Refills: 0 | Status: SHIPPED | OUTPATIENT
Start: 2024-12-21

## 2024-12-21 RX ORDER — DIAPER,BRIEF,INFANT-TODD,DISP
EACH MISCELLANEOUS
Qty: 28 G | Refills: 0 | Status: SHIPPED | OUTPATIENT
Start: 2024-12-21

## 2024-12-21 RX ORDER — IBUPROFEN 400 MG/1
400 TABLET, FILM COATED ORAL ONCE
Status: COMPLETED | OUTPATIENT
Start: 2024-12-21 | End: 2024-12-21

## 2024-12-21 RX ADMIN — IBUPROFEN 400 MG: 400 TABLET, FILM COATED ORAL at 12:34

## 2024-12-21 ASSESSMENT — PAIN SCALES - GENERAL
PAINLEVEL_OUTOF10: 7
PAINLEVEL_OUTOF10: 7

## 2024-12-21 ASSESSMENT — PAIN DESCRIPTION - PAIN TYPE: TYPE: ACUTE PAIN

## 2024-12-21 ASSESSMENT — PAIN - FUNCTIONAL ASSESSMENT: PAIN_FUNCTIONAL_ASSESSMENT: 0-10

## 2024-12-21 ASSESSMENT — PAIN DESCRIPTION - LOCATION
LOCATION: NECK;BACK
LOCATION: NECK;BACK

## 2024-12-21 ASSESSMENT — PAIN DESCRIPTION - DESCRIPTORS
DESCRIPTORS: ACHING
DESCRIPTORS: ACHING

## 2024-12-21 NOTE — ED NOTES
Pt refuses second/repeat BP check, states it hurts and will not allow cuff to be replaced at this time. MD aware.

## 2024-12-23 ENCOUNTER — HOSPITAL ENCOUNTER (EMERGENCY)
Age: 44
Discharge: LEFT BEFORE DISCHARGE | End: 2024-12-23

## 2024-12-24 NOTE — ED PROVIDER NOTES
normal.         Speech: Speech is tangential (a bit).         Behavior: Behavior normal.       Diagnostic Study Results     Labs -  No results found for this or any previous visit (from the past 24 hour(s)).     Radiologic Studies -   No orders to display     [unfilled]  [unfilled]    Medications given in the ED-  Medications   ibuprofen (ADVIL;MOTRIN) tablet 400 mg (400 mg Oral Given 12/21/24 1234)         Medical Decision Making   I am the first provider for this patient.    I reviewed the vital signs, available nursing notes, past medical history, past surgical history, family history and social history.    Vital Signs-Reviewed the patient's vital signs.    Pulse Oximetry Analysis -97% on room air    Records Reviewed: NURSING NOTES AND PREVIOUS MEDICAL RECORDS    Provider Notes (Medical Decision Making):   This a chronic condition minor complexity severity.  Patient with exacerbation of recurrent rash.  It is quite minor barely visible however I can see excoriations.  Will try to get him a cream because he tells me places the steak, I checked his address where he checked in and it is a vacant lot for building.  As soon as I finished my medical history he asked me for something to eat.      Social Determinants of Health     Tobacco Use: Medium Risk (12/21/2024)    Patient History     Smoking Tobacco Use: Former     Smokeless Tobacco Use: Never     Passive Exposure: Not on file   Alcohol Use: Not At Risk (12/21/2024)    AUDIT-C     Frequency of Alcohol Consumption: Never     Average Number of Drinks: Patient does not drink     Frequency of Binge Drinking: Never   Financial Resource Strain: Not on file   Food Insecurity: Not on file   Transportation Needs: Not on file   Physical Activity: Not on file   Stress: Not on file   Social Connections: Not on file   Intimate Partner Violence: Not on file (4/3/2022)   Depression: Not on file   Housing Stability: Low Risk  (2/1/2022)    Received from [facility],

## 2025-06-07 ENCOUNTER — HOSPITAL ENCOUNTER (EMERGENCY)
Age: 45
Discharge: HOME | End: 2025-06-07
Attending: EMERGENCY MEDICINE
Payer: COMMERCIAL

## 2025-06-07 VITALS
SYSTOLIC BLOOD PRESSURE: 146 MMHG | WEIGHT: 115 LBS | OXYGEN SATURATION: 99 % | DIASTOLIC BLOOD PRESSURE: 99 MMHG | HEART RATE: 75 BPM | HEIGHT: 65 IN | TEMPERATURE: 98.3 F | BODY MASS INDEX: 19.16 KG/M2 | RESPIRATION RATE: 18 BRPM

## 2025-06-07 DIAGNOSIS — L98.9 SKIN LESION: Primary | ICD-10-CM

## 2025-06-07 PROCEDURE — 99282 EMERGENCY DEPT VISIT SF MDM: CPT

## 2025-06-07 RX ORDER — SULFAMETHOXAZOLE AND TRIMETHOPRIM 800; 160 MG/1; MG/1
1 TABLET ORAL 2 TIMES DAILY
Qty: 14 TABLET | Refills: 0 | Status: SHIPPED | OUTPATIENT
Start: 2025-06-07 | End: 2025-06-09

## 2025-06-07 ASSESSMENT — PAIN - FUNCTIONAL ASSESSMENT: PAIN_FUNCTIONAL_ASSESSMENT: 0-10

## 2025-06-07 ASSESSMENT — PAIN SCALES - GENERAL: PAINLEVEL_OUTOF10: 0

## 2025-06-07 NOTE — ED PROVIDER NOTES
[facility] EMERGENCY DEPARTMENT  EMERGENCY DEPARTMENT ENCOUNTER      Pt Name: Cata Mckeon  MRN: [id number]  Birthdate 1980  Date of evaluation: 6/7/2025  Provider: Hong Guevara MD    CHIEF COMPLAINT       Chief Complaint   Patient presents with    Cyst       HPI:  Cata Mckeon is a 44 y.o. male who presents to the emergency department pt co rash to back x 1 2 weeks. Mild pain to touch only no other rash.  No injury     HPI    Nursing Notes were reviewed.    REVIEW OF SYSTEMS    (2-9 systems for level 4, 10 or more for level 5)     Review of Systems    Except as noted above the remainder of the review of systems was reviewed and negative.       PAST MEDICAL HISTORY     Past Medical History:   Diagnosis Date    Back pain     upper    Eczema     Hypertension     Psychiatric disorder     bipolar          SURGICAL HISTORY     No past surgical history on file.      CURRENT MEDICATIONS       Previous Medications    BALSUM PERU-CASTOR OIL (VENELEX) OINT OINTMENT    Apply topically 2 times daily    FAMOTIDINE (PEPCID) 20 MG TABLET    Take 1 tablet by mouth 2 times daily    HYDROCORTISONE 0.5 % OINTMENT    Apply topically 2 times daily.    IBUPROFEN (IBU) 400 MG TABLET    Take 1 tablet by mouth every 6 hours as needed for Pain    TRIAMCINOLONE (KENALOG) 0.025 % OINTMENT    Apply topically 2 times daily.       ALLERGIES     [place]    FAMILY HISTORY     No family history on file.       SOCIAL HISTORY       Social History     Socioeconomic History    Marital status: Single   Tobacco Use    Smoking status: Former     Current packs/day: 1.00     Types: Cigarettes, Cigars    Smokeless tobacco: Never   Vaping Use    Vaping status: Never Used   Substance and Sexual Activity    Alcohol use: Not Currently    Drug use: Not Currently    Sexual activity: Not Currently     Social Drivers of Health      [facility]    Transportation     tissue infection.  Det ret inst given         FINAL IMPRESSION      1. Skin lesion          DISPOSITION/PLAN   DISPOSITION Decision To Discharge 06/07/2025 01:02:12 PM      PATIENT REFERRED TO:  [facility] Emergency Department  [address] [address] [phone]  Go to   As needed, If symptoms worsen    Jass Ruelas, APRN - NP  [address]  [facility] [address] [phone]    Schedule an appointment as soon as possible for a visit in 2 days      Joseph Garcia MD  [address]  [facility] [facility] [address]  [phone]    Go in 2 days        DISCHARGE MEDICATIONS:  Discharge Medication List as of 6/7/2025  1:21 PM        START taking these medications    Details   sulfamethoxazole-trimethoprim (BACTRIM DS) 800-160 MG per tablet Take 1 tablet by mouth 2 times daily for 7 days, Disp-14 tablet, R-0Normal           Controlled Substances Monitoring:          No data to display                (Please note that portions of this note were completed with a voice recognition program.  Efforts were made to edit the dictations but occasionally words are mis-transcribed.)    Hong Guevara MD (electronically signed)  Attending Emergency Physician          Hong Guevara MD  06/09/25 6202

## 2025-06-07 NOTE — ED TRIAGE NOTES
Pt states he thinks he may have a spider bite on his back   states he didn't know it was there, but his mom saw it about a week ago   does not appear to be an abscess, rather some sort of sebaceous cyst/blackhead possibly

## 2025-06-09 ENCOUNTER — APPOINTMENT (OUTPATIENT)
Age: 45
End: 2025-06-09
Payer: COMMERCIAL

## 2025-06-09 ENCOUNTER — HOSPITAL ENCOUNTER (EMERGENCY)
Age: 45
Discharge: LEFT BEFORE DISCHARGE | End: 2025-06-09
Attending: EMERGENCY MEDICINE
Payer: COMMERCIAL

## 2025-06-09 VITALS
HEART RATE: 97 BPM | HEIGHT: 65 IN | OXYGEN SATURATION: 99 % | TEMPERATURE: 98.5 F | BODY MASS INDEX: 19.16 KG/M2 | SYSTOLIC BLOOD PRESSURE: 155 MMHG | DIASTOLIC BLOOD PRESSURE: 105 MMHG | RESPIRATION RATE: 18 BRPM | WEIGHT: 115 LBS

## 2025-06-09 DIAGNOSIS — M54.6 THORACIC BACK PAIN, UNSPECIFIED BACK PAIN LATERALITY, UNSPECIFIED CHRONICITY: Primary | ICD-10-CM

## 2025-06-09 PROCEDURE — 99281 EMR DPT VST MAYX REQ PHY/QHP: CPT

## 2025-06-09 RX ORDER — IBUPROFEN 600 MG/1
600 TABLET, FILM COATED ORAL ONCE
Status: DISCONTINUED | OUTPATIENT
Start: 2025-06-09 | End: 2025-06-09 | Stop reason: HOSPADM

## 2025-06-09 ASSESSMENT — PAIN SCALES - GENERAL: PAINLEVEL_OUTOF10: 9

## 2025-06-09 ASSESSMENT — PAIN DESCRIPTION - LOCATION: LOCATION: BACK

## 2025-06-09 ASSESSMENT — PAIN DESCRIPTION - ORIENTATION: ORIENTATION: UPPER

## 2025-06-09 ASSESSMENT — PAIN - FUNCTIONAL ASSESSMENT: PAIN_FUNCTIONAL_ASSESSMENT: 0-10

## 2025-06-09 ASSESSMENT — PAIN DESCRIPTION - PAIN TYPE: TYPE: ACUTE PAIN;CHRONIC PAIN

## 2025-06-09 NOTE — ED PROVIDER NOTES
[facility] EMERGENCY DEPARTMENT  EMERGENCY DEPARTMENT ENCOUNTER      Pt Name: Cata Mckeon  MRN: [id number]  Birthdate 1980  Date of evaluation: 6/9/2025  Provider: Hong Guevara MD    CHIEF COMPLAINT       Chief Complaint   Patient presents with    Back Pain       HPI:  Cata Mckeon is a 44 y.o. male who presents to the emergency department pt c/o upper back pain,  episodic x months.  No sob. No abd or cp.  No pain w respirations. No rash. No injuyr     HPI    Nursing Notes were reviewed.    REVIEW OF SYSTEMS    (2-9 systems for level 4, 10 or more for level 5)     Review of Systems    Except as noted above the remainder of the review of systems was reviewed and negative.       PAST MEDICAL HISTORY     Past Medical History:   Diagnosis Date    Back pain     upper    Eczema     Hypertension     Psychiatric disorder     bipolar          SURGICAL HISTORY     History reviewed. No pertinent surgical history.      CURRENT MEDICATIONS       Previous Medications    FAMOTIDINE (PEPCID) 20 MG TABLET    Take 1 tablet by mouth 2 times daily    HYDROCORTISONE 0.5 % OINTMENT    Apply topically 2 times daily.    IBUPROFEN (IBU) 400 MG TABLET    Take 1 tablet by mouth every 6 hours as needed for Pain    TRIAMCINOLONE (KENALOG) 0.025 % OINTMENT    Apply topically 2 times daily.       ALLERGIES     [place]    FAMILY HISTORY     History reviewed. No pertinent family history.       SOCIAL HISTORY       Social History     Socioeconomic History    Marital status: Single     Spouse name: None    Number of children: None    Years of education: None    Highest education level: None   Tobacco Use    Smoking status: Former     Current packs/day: 1.00     Types: Cigarettes, Cigars    Smokeless tobacco: Never   Vaping Use    Vaping status: Never Used   Substance and Sexual Activity    Alcohol use: Not Currently    Drug use: Not Currently    Sexual activity: Not Currently     Social Drivers of Health

## 2025-06-11 ENCOUNTER — HOSPITAL ENCOUNTER (EMERGENCY)
Age: 45
Discharge: HOME | End: 2025-06-11
Attending: EMERGENCY MEDICINE
Payer: COMMERCIAL

## 2025-06-11 VITALS
HEIGHT: 65 IN | RESPIRATION RATE: 16 BRPM | HEART RATE: 94 BPM | DIASTOLIC BLOOD PRESSURE: 113 MMHG | WEIGHT: 130 LBS | BODY MASS INDEX: 21.66 KG/M2 | TEMPERATURE: 98.3 F | SYSTOLIC BLOOD PRESSURE: 154 MMHG | OXYGEN SATURATION: 98 %

## 2025-06-11 DIAGNOSIS — M54.2 CHRONIC NECK PAIN: Primary | ICD-10-CM

## 2025-06-11 DIAGNOSIS — G89.29 CHRONIC NECK PAIN: Primary | ICD-10-CM

## 2025-06-11 PROCEDURE — 6370000000 HC RX 637 (ALT 250 FOR IP): Performed by: EMERGENCY MEDICINE

## 2025-06-11 PROCEDURE — 99283 EMERGENCY DEPT VISIT LOW MDM: CPT

## 2025-06-11 RX ORDER — IBUPROFEN 600 MG/1
600 TABLET, FILM COATED ORAL
Status: COMPLETED | OUTPATIENT
Start: 2025-06-11 | End: 2025-06-11

## 2025-06-11 RX ORDER — NAPROXEN 500 MG/1
500 TABLET ORAL 2 TIMES DAILY PRN
Qty: 20 TABLET | Refills: 0 | Status: SHIPPED | OUTPATIENT
Start: 2025-06-11 | End: 2025-06-21

## 2025-06-11 RX ORDER — ACETAMINOPHEN 500 MG
1000 TABLET ORAL 4 TIMES DAILY PRN
Qty: 30 TABLET | Refills: 0 | Status: SHIPPED | OUTPATIENT
Start: 2025-06-11

## 2025-06-11 RX ORDER — LIDOCAINE 50 MG/G
1 PATCH TOPICAL DAILY
Qty: 10 PATCH | Refills: 0 | Status: SHIPPED | OUTPATIENT
Start: 2025-06-11 | End: 2025-06-21

## 2025-06-11 RX ADMIN — IBUPROFEN 600 MG: 600 TABLET, FILM COATED ORAL at 12:44

## 2025-06-11 ASSESSMENT — PAIN - FUNCTIONAL ASSESSMENT: PAIN_FUNCTIONAL_ASSESSMENT: 0-10

## 2025-06-11 ASSESSMENT — PAIN SCALES - GENERAL: PAINLEVEL_OUTOF10: 7

## 2025-06-11 NOTE — ED TRIAGE NOTES
Patient arrives to ED ambulatory with report of neck and upper back pain worse with movement, has not taken anything today for pain, states he walked miles to get here.

## 2025-06-15 ENCOUNTER — HOSPITAL ENCOUNTER (EMERGENCY)
Age: 45
Discharge: HOME | End: 2025-06-15
Attending: STUDENT IN AN ORGANIZED HEALTH CARE EDUCATION/TRAINING PROGRAM
Payer: COMMERCIAL

## 2025-06-15 VITALS
HEART RATE: 96 BPM | TEMPERATURE: 97.9 F | HEIGHT: 65 IN | WEIGHT: 130 LBS | RESPIRATION RATE: 18 BRPM | OXYGEN SATURATION: 98 % | BODY MASS INDEX: 21.66 KG/M2 | DIASTOLIC BLOOD PRESSURE: 99 MMHG | SYSTOLIC BLOOD PRESSURE: 145 MMHG

## 2025-06-15 DIAGNOSIS — L55.9 SUNBURN: Primary | ICD-10-CM

## 2025-06-15 PROCEDURE — 99282 EMERGENCY DEPT VISIT SF MDM: CPT

## 2025-06-15 ASSESSMENT — PAIN SCALES - GENERAL: PAINLEVEL_OUTOF10: 0

## 2025-06-15 ASSESSMENT — PAIN - FUNCTIONAL ASSESSMENT: PAIN_FUNCTIONAL_ASSESSMENT: 0-10

## 2025-06-15 NOTE — ED PROVIDER NOTES
[facility] EMERGENCY DEPARTMENT  EMERGENCY DEPARTMENT ENCOUNTER      Pt Name: Cata Mckeon  MRN: [id number]  Birthdate 1980  Date of evaluation: 6/15/2025  Provider: Sam Lamb MD   PCP: Michelle, Pcp  Note Started: 12:26 PM 6/15/25     CHIEF COMPLAINT       Chief Complaint   Patient presents with    Sunburn        HISTORY OF PRESENT ILLNESS: 1 or more elements      History From: Patient  History limited by: Nothing     Cata Mckeon is a 44 y.o. male with history of hypertension who presents for evaluation of a rash to his neck/back.  Reports increased on exposure and states it feels like he has a sunburn.  Also reports a \"bubbling sensation in his stomach however states that this is because he is hungry.  He is requesting food.  No fevers, chills, nausea, vomiting, chest pain, shortness of breath, change urination, change in bowel movements     Nursing Notes were all reviewed and agreed with or any disagreements were addressed in the HPI.     REVIEW OF SYSTEMS      Review of Systems   Constitutional:  Negative for chills and fever.   HENT:  Negative for congestion.    Eyes:  Negative for pain and visual disturbance.   Respiratory:  Negative for chest tightness and shortness of breath.    Cardiovascular:  Negative for chest pain and palpitations.   Gastrointestinal:  Negative for nausea and vomiting.        No abdominal pain however reports a bubbling sensation in his abdomen   Genitourinary:  Negative for dysuria and frequency.   Skin:         Reports some burning   Neurological:  Negative for weakness and numbness.   Psychiatric/Behavioral:  Negative for confusion.         Positives and Pertinent negatives as per HPI.    PAST HISTORY     Past Medical History:  Past Medical History:   Diagnosis Date    Back pain     upper    Eczema     Hypertension     Psychiatric disorder     bipolar        Past Surgical History:  History reviewed. No pertinent surgical history.    Family

## 2025-06-15 NOTE — ED TRIAGE NOTES
Pt states he has sunburn on the left side of his neck and his stomach was bubbling, states he didn't eat.

## 2025-06-16 NOTE — ED PROVIDER NOTES
[facility] EMERGENCY DEPT  EMERGENCY DEPARTMENT HISTORY AND PHYSICAL EXAM      Date of evaluation: 6/11/2025  Patient Name: Cata Mckeon  Birthdate 1980  MRN: [id number]  ED Provider: Florentin Macedo MD   Note Started: 11:44 PM EDT 6/15/25    HISTORY OF PRESENT ILLNESS     Chief Complaint   Patient presents with    Neck Pain    Back Pain       History Provided By: Patient, only     HPI: Cata Mckeon is a 44 y.o. male Chief Complaint  Neck and back pain for 11 years     History of Present Illness  - Mr. Bergman is a 44-year-old male presenting to the ED  with back pain, with a history of bipolar disorder.  - Back and neck pain:    - Ongoing for 11 years    - Worse with movement    - Has not taken any medication for pain    - Had a nap and lunch, which may have affected the pain  - Recent travel:    - Visited [place] and [place] recently    - Multiple ER visits had multiple facilities in [facility] and [facility]  - Medication:    - Not currently taking bipolar medications    - Reports bipolar medications were helpful in the past  - Social:    - Currently staying at home  - Denies:    - Recent falls or excessive walking    - Alcohol use    - Thoughts of self-harm or harming others    Medications and Supplements  - Bipolar medications    - Discontinued. Previously helped the patient.    PAST MEDICAL HISTORY   Past Medical History:  Past Medical History:   Diagnosis Date    Back pain     upper    Eczema     Hypertension     Psychiatric disorder     bipolar        Past Surgical History:  No past surgical history on file.    Family History:  No family history on file.    Social History:  Social History     Tobacco Use    Smoking status: Former     Current packs/day: 1.00     Types: Cigarettes, Cigars    Smokeless tobacco: Never   Vaping Use    Vaping status: Never Used   Substance Use Topics    Alcohol use: Not Currently    Drug use: Not Currently

## 2025-06-25 ENCOUNTER — HOSPITAL ENCOUNTER (EMERGENCY)
Facility: HOSPITAL | Age: 45
Discharge: LEFT BEFORE DISCHARGE | End: 2025-06-25

## 2025-06-28 ENCOUNTER — HOSPITAL ENCOUNTER (EMERGENCY)
Facility: HOSPITAL | Age: 45
Discharge: HOME | End: 2025-06-28
Attending: EMERGENCY MEDICINE
Payer: COMMERCIAL

## 2025-06-28 VITALS
DIASTOLIC BLOOD PRESSURE: 105 MMHG | SYSTOLIC BLOOD PRESSURE: 146 MMHG | WEIGHT: 124.12 LBS | TEMPERATURE: 97.7 F | HEIGHT: 65 IN | HEART RATE: 86 BPM | OXYGEN SATURATION: 99 % | RESPIRATION RATE: 18 BRPM | BODY MASS INDEX: 20.68 KG/M2

## 2025-06-28 DIAGNOSIS — H53.10 EYE STRAIN, BILATERAL: Primary | ICD-10-CM

## 2025-06-28 PROCEDURE — 99282 EMERGENCY DEPT VISIT SF MDM: CPT

## 2025-06-28 ASSESSMENT — ENCOUNTER SYMPTOMS
SHORTNESS OF BREATH: 0
COUGH: 0
ABDOMINAL PAIN: 0
EYE PAIN: 1

## 2025-06-28 ASSESSMENT — PAIN SCALES - GENERAL: PAINLEVEL_OUTOF10: 5

## 2025-06-28 ASSESSMENT — PAIN DESCRIPTION - PAIN TYPE: TYPE: ACUTE PAIN

## 2025-06-28 ASSESSMENT — PAIN DESCRIPTION - FREQUENCY: FREQUENCY: INTERMITTENT

## 2025-06-28 ASSESSMENT — PAIN DESCRIPTION - ONSET: ONSET: ON-GOING

## 2025-06-28 ASSESSMENT — PAIN DESCRIPTION - LOCATION: LOCATION: EYE

## 2025-06-28 ASSESSMENT — PAIN DESCRIPTION - ORIENTATION: ORIENTATION: RIGHT;LEFT

## 2025-06-28 ASSESSMENT — PAIN - FUNCTIONAL ASSESSMENT
PAIN_FUNCTIONAL_ASSESSMENT: ACTIVITIES ARE NOT PREVENTED
PAIN_FUNCTIONAL_ASSESSMENT: 0-10

## 2025-06-28 ASSESSMENT — PAIN DESCRIPTION - DESCRIPTORS: DESCRIPTORS: ACHING

## 2025-06-29 NOTE — ED NOTES
[facility] Nursing Note        Patient Name: Cata Mckeon      : 1980             MRN: [id number]      Chief Complaint: Eye Pain      Admit Diagnosis: No admission diagnoses are documented for this encounter.      Surgery: * No surgery found *            MD/RN reviewed discharge instructions and options with patient. Patient verbalized understanding. RN reviewed discharge instructions using teach back method. Patient ambulatory to exit without difficulty and no acute signs of distress. No complaints or needs expressed at this time. Patient counseled on medications prescribed at discharge (If prescribed). Vital signs stable. Patient to follow up with PCP/Specialist on the next business day for appointment. All questions answered by ER RN.          Lines:        Vitals: Patient Vitals for the past 12 hrs:   Temp Pulse Resp BP SpO2   25 2147 97.7 °F (36.5 °C) 86 18 (!) 146/105 99 %         Signed by: Didier Bruno RN, DARSHAN, BSN, CMSRN                                              2025 at 10:26 PM

## 2025-06-29 NOTE — ED PROVIDER NOTES
[facility] EMERGENCY DEPARTMENT  EMERGENCY DEPARTMENT ENCOUNTER      Pt Name: Cata Mckeon  MRN: [id number]  Birthdate 1980  Date of evaluation: 6/28/2025  Provider: Johanne Beth MD    CHIEF COMPLAINT       Chief Complaint   Patient presents with    Eye Pain         HISTORY OF PRESENT ILLNESS    HPI    Cata Mckeon is a 44 y.o. male with PMH significant for bipolar disorder who presents to the emergency department with complaints of eyestrain while at the bus stop earlier this evening.  Patient reports his eyestrain was at a 9.75 and now is at a 5.  Denies any trauma.  Denies any foreign body sensation.  Patient reports he would like to be discharged now and does not need any further evaluation or management.  No other complaints.  Nursing Notes were reviewed.    REVIEW OF SYSTEMS       Review of Systems   Constitutional:  Negative for fever.   Eyes:  Positive for pain. Negative for visual disturbance.   Respiratory:  Negative for cough and shortness of breath.    Cardiovascular:  Negative for chest pain.   Gastrointestinal:  Negative for abdominal pain.   Musculoskeletal:  Negative for myalgias.   Skin:  Negative for wound.           PAST MEDICAL HISTORY     Past Medical History:   Diagnosis Date    Back pain     upper    Eczema     Hypertension     Psychiatric disorder     bipolar          SURGICAL HISTORY     No past surgical history on file.      CURRENT MEDICATIONS       Previous Medications    ACETAMINOPHEN (TYLENOL) 500 MG TABLET    Take 2 tablets by mouth 4 times daily as needed for Pain    FAMOTIDINE (PEPCID) 20 MG TABLET    Take 1 tablet by mouth 2 times daily    HYDROCORTISONE 0.5 % OINTMENT    Apply topically 2 times daily.    NAPROXEN (NAPROSYN) 500 MG TABLET    Take 1 tablet by mouth 2 times daily as needed for Pain (with meals)    TRIAMCINOLONE (KENALOG) 0.025 % OINTMENT    Apply topically 2 times daily.       ALLERGIES     [place]    FAMILY HISTORY     No family history on

## 2025-06-29 NOTE — ED TRIAGE NOTES
[facility] Nursing Note        Patient Name: Cata Mckeon      : 1980             MRN: [id number]      Chief Complaint:  Eye Pain      Admit Diagnosis: No admission diagnoses are documented for this encounter.      Admitting Provider: No admitting provider for patient encounter.      Surgery: * No surgery found *           Patient arrived to the ER ambulatory from [facility] with complaints of Eye Pain that started 4 days ago.         Lines:        Signed by: Didier Bruno RN, DARSHAN, BSN, CMSRN                                              2025 at 9:47 PM

## 2025-07-10 ENCOUNTER — HOSPITAL ENCOUNTER (EMERGENCY)
Facility: HOSPITAL | Age: 45
Discharge: HOME | End: 2025-07-10
Attending: STUDENT IN AN ORGANIZED HEALTH CARE EDUCATION/TRAINING PROGRAM

## 2025-07-10 ENCOUNTER — HOSPITAL ENCOUNTER (EMERGENCY)
Facility: HOSPITAL | Age: 45
Discharge: HOME | End: 2025-07-10
Attending: STUDENT IN AN ORGANIZED HEALTH CARE EDUCATION/TRAINING PROGRAM
Payer: COMMERCIAL

## 2025-07-10 VITALS
DIASTOLIC BLOOD PRESSURE: 106 MMHG | OXYGEN SATURATION: 99 % | RESPIRATION RATE: 16 BRPM | TEMPERATURE: 98 F | SYSTOLIC BLOOD PRESSURE: 150 MMHG | WEIGHT: 125.88 LBS | BODY MASS INDEX: 20.95 KG/M2 | HEART RATE: 110 BPM

## 2025-07-10 DIAGNOSIS — L20.82 FLEXURAL ECZEMA: Primary | ICD-10-CM

## 2025-07-10 PROCEDURE — 99283 EMERGENCY DEPT VISIT LOW MDM: CPT

## 2025-07-10 RX ORDER — TRIAMCINOLONE ACETONIDE 0.25 MG/G
OINTMENT TOPICAL
Qty: 80 G | Refills: 1 | Status: SHIPPED | OUTPATIENT
Start: 2025-07-10 | End: 2025-07-17

## 2025-07-10 ASSESSMENT — PAIN SCALES - GENERAL: PAINLEVEL_OUTOF10: 6

## 2025-07-11 NOTE — ED TRIAGE NOTES
Patient arrives with complaint of rash to right upper thigh and both arms x 2 weeks. Patient has tried eczema cream without relief.

## 2025-07-11 NOTE — ED PROVIDER NOTES
EMERGENCY DEPARTMENT PHYSICIAN NOTE     Patient: Cata Mckeon     Time of Service: 7/10/2025  8:00 PM     Chief complaint:   Chief Complaint   Patient presents with    Skin Problem        HISTORY:  Patient is a 44 y.o. male who presents to the emergency department with complaints of itchy rash on the flexor portion of the arm as well as right groin.  Patient has history of eczema.  Patient's arms certainly look like eczema, groin may be a contact dermatitis.  Patient has been using moisturizing cream without improvement.  His vital signs are stable and he is afebrile.  Patient states he is trying to get to [place].  Took [employer] from [place] to [place].  Patient appears to be homeless or at least with no home in [place].  Requesting shoes and size 8 or 9.  Asked nursing to see if anything was available in the close this closet for the patient.      Past Medical History:   Diagnosis Date    Back pain     upper    Eczema     Hypertension     Psychiatric disorder     bipolar         History reviewed. No pertinent surgical history.     History reviewed. No pertinent family history.     Social History     Socioeconomic History    Marital status: Single     Spouse name: None    Number of children: None    Years of education: None    Highest education level: None   Tobacco Use    Smoking status: Former     Current packs/day: 1.00     Types: Cigarettes, Cigars    Smokeless tobacco: Never   Vaping Use    Vaping status: Never Used   Substance and Sexual Activity    Alcohol use: Not Currently    Drug use: Not Currently    Sexual activity: Not Currently     Social Drivers of Health     Food Insecurity: Food Insecurity Present (4/1/2025)    Received from [facility]    Hunger Vital Sign     Worried About Running Out of Food in the Last Year: Often true     Ran Out of Food in the Last Year: Often true   Transportation Needs: Unmet Transportation Needs (4/1/2025)    Received from

## 2025-07-11 NOTE — ED NOTES
Patient stable at time of discharge. Reviewed discharge instructions and medications. Provided with Good [facility] card as well as [facility] sheet for resources. Allowed time for questions. Patient verbalized understanding. Ambulatory out of department with steady gait unaccompanied.

## 2025-07-11 NOTE — DISCHARGE INSTRUCTIONS
You presented the ED with a rash on your arms as well as your right groin.  Most consistent with contact dermatitis.  He also a history of eczema so I suspect arms are more eczema in the groin is more of a dermatitis.  Either way triamcinolone antibiotic ointment will help.  Use as prescribed.

## 2025-07-14 ENCOUNTER — HOSPITAL ENCOUNTER (EMERGENCY)
Facility: HOSPITAL | Age: 45
Discharge: LEFT BEFORE DISCHARGE | End: 2025-07-14
Attending: STUDENT IN AN ORGANIZED HEALTH CARE EDUCATION/TRAINING PROGRAM
Payer: COMMERCIAL

## 2025-07-14 VITALS
OXYGEN SATURATION: 98 % | TEMPERATURE: 98.2 F | HEART RATE: 82 BPM | RESPIRATION RATE: 18 BRPM | SYSTOLIC BLOOD PRESSURE: 177 MMHG | DIASTOLIC BLOOD PRESSURE: 109 MMHG

## 2025-07-14 PROCEDURE — 99281 EMR DPT VST MAYX REQ PHY/QHP: CPT

## 2025-07-14 PROCEDURE — 4500000002 HC ER NO CHARGE

## 2025-07-14 NOTE — ED NOTES
Patient eloped. Patient left department ambulatory with steady gait with belongings in hand. Charge RN made aware.

## 2025-07-14 NOTE — ED TRIAGE NOTES
Patient arrives with cc of dry skin to bilateral arms and dry skin to the chest. Patient arrives ambulatory, A & Ox 4, and pov. Patient report dry skin has been on going for a couple months. Patient reports ointments he received from here has helped.

## 2025-07-14 NOTE — ED NOTES
Patient provided with peanut butter crackers, rebeka crackers, crustable, water, and apple juice. Patient provided with hygiene products such as mouth wash, toothbrush, toothpaste, lotion, vaseline, comb, shampoo/conditioner kits and socks per patient request.

## 2025-07-15 ENCOUNTER — HOSPITAL ENCOUNTER (EMERGENCY)
Facility: HOSPITAL | Age: 45
Discharge: HOME | End: 2025-07-15
Attending: STUDENT IN AN ORGANIZED HEALTH CARE EDUCATION/TRAINING PROGRAM
Payer: COMMERCIAL

## 2025-07-15 VITALS
OXYGEN SATURATION: 100 % | RESPIRATION RATE: 16 BRPM | SYSTOLIC BLOOD PRESSURE: 163 MMHG | HEART RATE: 84 BPM | DIASTOLIC BLOOD PRESSURE: 110 MMHG | TEMPERATURE: 98.3 F

## 2025-07-15 DIAGNOSIS — Z59.00 HOMELESSNESS: Primary | ICD-10-CM

## 2025-07-15 PROCEDURE — 99283 EMERGENCY DEPT VISIT LOW MDM: CPT

## 2025-07-15 PROCEDURE — 93005 ELECTROCARDIOGRAM TRACING: CPT

## 2025-07-15 RX ORDER — IBUPROFEN 400 MG/1
400 TABLET, FILM COATED ORAL
Status: DISCONTINUED | OUTPATIENT
Start: 2025-07-15 | End: 2025-07-15 | Stop reason: HOSPADM

## 2025-07-15 SDOH — ECONOMIC STABILITY - HOUSING INSECURITY: HOMELESSNESS UNSPECIFIED: Z59.00

## 2025-07-15 ASSESSMENT — PAIN DESCRIPTION - LOCATION: LOCATION: BACK;ABDOMEN

## 2025-07-15 ASSESSMENT — PAIN DESCRIPTION - FREQUENCY: FREQUENCY: CONTINUOUS

## 2025-07-15 ASSESSMENT — PAIN - FUNCTIONAL ASSESSMENT: PAIN_FUNCTIONAL_ASSESSMENT: 0-10

## 2025-07-15 ASSESSMENT — PAIN SCALES - GENERAL: PAINLEVEL_OUTOF10: 8

## 2025-07-16 NOTE — ED PROVIDER NOTES
[facility] EMERGENCY DEPT  EMERGENCY DEPARTMENT HISTORY AND PHYSICAL EXAM      Date of evaluation: 7/15/2025  Patient Name: Cata Mckeon  Birthdate 1980  MRN: [id number]  ED Provider: Artur Owens MD   Note Started: 9:49 PM EDT 7/15/25    HISTORY OF PRESENT ILLNESS     Chief Complaint   Patient presents with    Abdominal Pain     back    Back Pain     X 2 hrs       History Provided By: Patient, only     HPI: Cata Mckeon is a 44 y.o. male with past medical history as reviewed below presents for evaluation stating that he is currently homeless, would like to find a way to get [place].  He endorses back pain from carrying heavy objects.  He requests no lab work or imaging today.    PAST MEDICAL HISTORY   Past Medical History:  Past Medical History:   Diagnosis Date    Back pain     upper    Eczema     Hypertension     Psychiatric disorder     bipolar        Past Surgical History:  No past surgical history on file.    Family History:  No family history on file.    Social History:  Social History     Tobacco Use    Smoking status: Former     Current packs/day: 1.00     Types: Cigarettes, Cigars    Smokeless tobacco: Never   Vaping Use    Vaping status: Never Used   Substance Use Topics    Alcohol use: Not Currently    Drug use: Not Currently       Allergies:  Allergies   Allergen Reactions    Citrus Rash       PCP: No, Pcp    Current Meds:   Current Facility-Administered Medications   Medication Dose Route Frequency Provider Last Rate Last Admin    ibuprofen (ADVIL;MOTRIN) tablet 400 mg  400 mg Oral NOW Artur Owens MD         Current Outpatient Medications   Medication Sig Dispense Refill    triamcinolone (KENALOG) 0.025 % ointment Apply topically 2 times daily. (Patient not taking: Reported on 7/14/2025) 80 g 1    naproxen (NAPROSYN) 500 MG tablet Take 1 tablet by mouth 2 times daily as needed for Pain (with meals) 20 tablet 0    acetaminophen (TYLENOL) 500 MG tablet Take 2

## 2025-07-16 NOTE — ED TRIAGE NOTES
Pt presents to ED with cc abdominal and back pain x 2 hrs. Patient states he has some nausea and vomiting. Denies chest pain at this time. Patient states he has HTN but does not take medication at this time.   Pt family  will safely and independently demonstrate ROM exercises  to BUE/LE within 1 week

## 2025-07-16 NOTE — DISCHARGE INSTRUCTIONS
Thank you for choosing our Emergency Department for your care.  It is our privilege to care for you in your time of need.  In the next several days, you may receive a survey via email or mailed to your home about your experience with our team.  We would greatly appreciate you taking a few minutes to complete the survey, as we use this information to learn what we have done well and what we could be doing better. Thank you for trusting us with your care!    Below you will find a list of your tests from today's visit.   Labs and Radiology Studies  No results found for this or any previous visit (from the past 12 hours).  No results found.  ------------------------------------------------------------------------------------------------------------  The evaluation and treatment you received in the Emergency Department were for an urgent problem. It is important that you follow-up with a doctor, nurse practitioner, or physician assistant to:  (1) confirm your diagnosis,  (2) re-evaluation of changes in your illness and treatment, and (3) for ongoing care. Please take your discharge instructions with you when you go to your follow-up appointment.     If you have any problem arranging a follow-up appointment, contact us!  If your symptoms become worse or you do not improve as expected, please return to us. We are available 24 hours a day.     If a prescription has been provided, please fill it as soon as possible to prevent a delay in treatment. If you have any questions or reservations about taking the medication due to side effects or interactions with other medications, please call your primary care provider or contact us directly.  Again, THANK YOU for choosing us to care for YOU!      ----------------------      ______________________________________________________________________  [facility]    For women and children  [address] [facility],

## 2025-07-18 ENCOUNTER — HOSPITAL ENCOUNTER (EMERGENCY)
Facility: HOSPITAL | Age: 45
Discharge: LEFT BEFORE DISCHARGE | End: 2025-07-18

## 2025-07-18 DIAGNOSIS — Z53.21 ELOPED FROM EMERGENCY DEPARTMENT: Primary | ICD-10-CM

## 2025-07-26 ENCOUNTER — HOSPITAL ENCOUNTER (EMERGENCY)
Facility: HOSPITAL | Age: 45
Discharge: LEFT BEFORE DISCHARGE | End: 2025-07-26

## 2025-07-27 NOTE — ED NOTES
This RN and RICHI Sanchez attempting to triage patient and obtain vitals  Pt. Becomes agitated during vitals, states \"you know what, I'm just going to leave and come back some other time\"  Pt. Departed in NAD, ambulatory with steady gait, A/Ox4 and following commands